# Patient Record
Sex: MALE | Race: WHITE | Employment: FULL TIME | URBAN - NONMETROPOLITAN AREA
[De-identification: names, ages, dates, MRNs, and addresses within clinical notes are randomized per-mention and may not be internally consistent; named-entity substitution may affect disease eponyms.]

---

## 2020-07-15 VITALS
SYSTOLIC BLOOD PRESSURE: 160 MMHG | RESPIRATION RATE: 18 BRPM | HEIGHT: 70 IN | BODY MASS INDEX: 35.65 KG/M2 | DIASTOLIC BLOOD PRESSURE: 98 MMHG | HEART RATE: 78 BPM | WEIGHT: 249 LBS | OXYGEN SATURATION: 97 % | TEMPERATURE: 98.6 F

## 2020-07-15 RX ORDER — CYCLOBENZAPRINE HCL 5 MG
5 TABLET ORAL 3 TIMES DAILY PRN
COMMUNITY
End: 2020-11-27 | Stop reason: CLARIF

## 2020-07-15 RX ORDER — HYDROCODONE BITARTRATE AND ACETAMINOPHEN 5; 325 MG/1; MG/1
1 TABLET ORAL 4 TIMES DAILY
COMMUNITY
End: 2020-11-27 | Stop reason: CLARIF

## 2020-08-06 ENCOUNTER — OFFICE VISIT (OUTPATIENT)
Dept: FAMILY MEDICINE CLINIC | Age: 45
End: 2020-08-06
Payer: COMMERCIAL

## 2020-08-06 VITALS
HEART RATE: 71 BPM | WEIGHT: 247.4 LBS | TEMPERATURE: 98.1 F | HEIGHT: 70 IN | SYSTOLIC BLOOD PRESSURE: 130 MMHG | BODY MASS INDEX: 35.42 KG/M2 | OXYGEN SATURATION: 97 % | DIASTOLIC BLOOD PRESSURE: 76 MMHG

## 2020-08-06 PROBLEM — I10 ESSENTIAL HYPERTENSION: Status: ACTIVE | Noted: 2020-08-06

## 2020-08-06 PROCEDURE — 99213 OFFICE O/P EST LOW 20 MIN: CPT | Performed by: NURSE PRACTITIONER

## 2020-08-06 RX ORDER — LOSARTAN POTASSIUM 100 MG/1
100 TABLET ORAL DAILY
Qty: 60 TABLET | Refills: 5 | Status: SHIPPED
Start: 2020-08-06 | End: 2021-04-30 | Stop reason: SDUPTHER

## 2020-08-06 RX ORDER — LOSARTAN POTASSIUM 100 MG/1
1 TABLET ORAL 2 TIMES DAILY
COMMUNITY
Start: 2020-06-30 | End: 2020-08-06 | Stop reason: SDUPTHER

## 2020-08-06 SDOH — HEALTH STABILITY: MENTAL HEALTH: HOW OFTEN DO YOU HAVE A DRINK CONTAINING ALCOHOL?: MONTHLY OR LESS

## 2020-08-06 SDOH — HEALTH STABILITY: MENTAL HEALTH: HOW MANY STANDARD DRINKS CONTAINING ALCOHOL DO YOU HAVE ON A TYPICAL DAY?: 1 OR 2

## 2020-08-06 ASSESSMENT — ENCOUNTER SYMPTOMS
TROUBLE SWALLOWING: 0
VOICE CHANGE: 0
VOMITING: 0
SHORTNESS OF BREATH: 0
ABDOMINAL PAIN: 0
NAUSEA: 0
BACK PAIN: 1
CONSTIPATION: 0
DIARRHEA: 0
COUGH: 0
WHEEZING: 0
CHEST TIGHTNESS: 0
BLOOD IN STOOL: 0

## 2020-08-06 ASSESSMENT — PATIENT HEALTH QUESTIONNAIRE - PHQ9
SUM OF ALL RESPONSES TO PHQ QUESTIONS 1-9: 0
2. FEELING DOWN, DEPRESSED OR HOPELESS: 0
SUM OF ALL RESPONSES TO PHQ QUESTIONS 1-9: 0
SUM OF ALL RESPONSES TO PHQ9 QUESTIONS 1 & 2: 0
1. LITTLE INTEREST OR PLEASURE IN DOING THINGS: 0

## 2020-08-06 NOTE — PATIENT INSTRUCTIONS
Patient Education        losartan  Pronunciation:  ana paula TAB tan  Brand:  Cozaar  What is the most important information I should know about losartan? Do not use if you are pregnant, and tell your doctor right away if you become pregnant. Losartan can cause injury or death to the unborn baby during your second or third trimester. If you have diabetes, do not use losartan together with any medication that contains aliskiren (a blood pressure medicine). What is losartan? Losartan is an angiotensin II receptor antagonist (sometimes called an ARB blocker). Losartan is used to treat high blood pressure (hypertension) in adults and children who are at least 10years old. It is also used to lower the risk of stroke in certain people with heart disease. Losartan is also used to slow long-term kidney damage in people with type 2 diabetes who also have high blood pressure. Losartan may also be used for purposes not listed in this medication guide. What should I discuss with my healthcare provider before taking losartan? You should not use losartan if you are allergic to it. If you have diabetes, do not use losartan together with any medication that contains aliskiren (a blood pressure medicine). You may also need to avoid taking losartan with aliskiren if you have kidney disease. Do not use if you are pregnant, and tell your doctor right away if you become pregnant. Losartan can cause injury or death to the unborn baby if you take the medicine during your second or third trimester. Tell your doctor if you have ever had:  · kidney disease;  · liver disease;  · congestive heart failure;  · an electrolyte imbalance (such as high levels of potassium in your blood);  · if you are on a low-salt diet; or  · if you are dehydrated. You should not breast-feed while using this medicine. Losartan is not approved for use by anyone younger than 10years old. How should I take losartan?   Follow all directions on your prescription label and read all medication guides or instruction sheets. Your doctor may occasionally change your dose. Use the medicine exactly as directed. You may take losartan with or without food. Call your doctor if you are sick with vomiting or diarrhea, or if you are sweating more than usual. You can easily become dehydrated while taking losartan. This can lead to very low blood pressure, a serious electrolyte imbalance, or kidney failure. Your blood pressure will need to be checked often and you may need other blood and urine tests. It may take 3 to 6 weeks before your blood pressure is under control. For best results, keep using the medicine as directed. Talk with your doctor if your condition does not improve after 3 weeks of treatment. If you have high blood pressure, keep using this medicine even if you feel well. High blood pressure often has no symptoms. You may need to use blood pressure medicine for the rest of your life. Store at room temperature away from moisture, heat, and light. What happens if I miss a dose? Take the medicine as soon as you can, but skip the missed dose if it is almost time for your next dose. Do not take two doses at one time. What happens if I overdose? Seek emergency medical attention or call the Poison Help line at 1-565.859.4772. What should I avoid while taking losartan? Drinking alcohol can further lower your blood pressure and may increase certain side effects of losartan. Do not use potassium supplements or salt substitutes, unless your doctor has told you to. Avoid getting up too fast from a sitting or lying position, or you may feel dizzy. What are the possible side effects of losartan? Get emergency medical help if you have signs of an allergic reaction: hives; difficult breathing; swelling of your face, lips, tongue, or throat.   Call your doctor at once if you have:  · a light-headed feeling, like you might pass out;  · pain or burning when you therapy. [facility] drug information is an informational resource designed to assist licensed healthcare practitioners in caring for their patients and/or to serve consumers viewing this service as a supplement to, and not a substitute for, the expertise, skill, knowledge and judgment of healthcare practitioners. The absence of a warning for a given drug or drug combination in no way should be construed to indicate that the drug or drug combination is safe, effective or appropriate for any given patient. [facility] does not assume any responsibility for any aspect of healthcare administered with the aid of information [facility] provides. The information contained herein is not intended to cover all possible uses, directions, precautions, warnings, drug interactions, allergic reactions, or adverse effects. If you have questions about the drugs you are taking, check with your doctor, nurse or pharmacist.  Copyright 6057-7320 [address]. Version: 16.01. Revision date: 4/10/2019. Care instructions adapted under license by [facility] ([facility]). If you have questions about a medical condition or this instruction, always ask your healthcare professional. [place] [address] any warranty or liability for your use of this information. Patient Education        Home Blood Pressure Test: About This Test  What is it? A home blood pressure test allows you to keep track of your blood pressure at home. Blood pressure is a measure of the force of blood against the walls of your arteries. Blood pressure readings include two numbers, such as 130/80 (say \"130 over 80\"). The first number is the systolic pressure. The second number is the diastolic pressure. Why is this test done? You may do this test at home to:  · Find out if you have high blood pressure. · Track your blood pressure if you have high blood pressure. · Track how well medicine is working to reduce high blood pressure.   · Check how lifestyle changes, such as weight loss and exercise, are affecting blood pressure. How do you prepare for the test?  For at least 30 minutes before you take your blood pressure, don't exercise or use caffeine, tobacco, or medicines that raise blood pressure. Take your blood pressure while you feel comfortable and relaxed. Sit quietly with both feet on the floor for at least 5 minutes before the test.  How is the test done? · Sit with your arm slightly bent and resting on a table so that your upper arm is at the same level as your heart. · Roll up your sleeve or take off your shirt to expose your upper arm. · Wrap the blood pressure cuff around your upper arm so that the lower edge of the cuff is about 1 inch above the bend of your elbow. Proceed with the following steps depending on if you are using an automatic or manual pressure monitor. Automatic blood pressure monitors  · Press the on/off button on the automatic monitor and wait until the ready-to-measure \"heart\" symbol appears next to zero in the display window. · Press the start button. The cuff will inflate and deflate by itself. · Your blood pressure numbers will appear on the screen. · Write your numbers in your log book, along with the date and time. Manual blood pressure monitors  · Place the earpieces of a stethoscope in your ears, and place the bell of the stethoscope over the artery, just below the cuff. · Close the valve on the rubber inflating bulb. · Squeeze the bulb rapidly with your opposite hand to inflate the cuff until the dial or column of mercury reads about 30 mm Hg higher than your usual systolic pressure. If you do not know your usual pressure, inflate the cuff to 210 mm Hg or until the pulse at your wrist disappears. · Open the pressure valve just slightly by twisting or pressing the valve on the bulb.   · As you watch the pressure slowly fall, note the level on the dial at which you first start to hear a pulsing or tapping sound through the stethoscope. This is your systolic blood pressure. · Continue letting the air out slowly. The sounds will become muffled and will finally disappear. Note the pressure when the sounds completely disappear. This is your diastolic blood pressure. Let out all the remaining air. · Write your numbers in your log book, along with the date and time. Follow-up care is a key part of your treatment and safety. Be sure to make and go to all appointments, and call your doctor if you are having problems. It's also a good idea to keep a list of the medicines you take. Where can you learn more? Go to [email or web address] and sign in to your [email or web address] account. Enter C427 in the [employer] box to learn more about \"Home Blood Pressure Test: About This Test.\"     If you do not have an account, please click on the \"Sign Up Now\" link. Current as of: December 16, 2019               Content Version: 12.5  © 3410-9781 Healthwise, Incorporated. Care instructions adapted under license by [facility] ([facility]). If you have questions about a medical condition or this instruction, always ask your healthcare professional. [place] [address] any warranty or liability for your use of this information.

## 2020-08-06 NOTE — PROGRESS NOTES
20  Ryderyohan Levin : 1975 Sex: male  Age: 39 y.o. Chief Complaint   Patient presents with    Other     visit after seeing Dr. Josefa Dickey and having MRI done. Patient was seen by the physician extender and an MRI was ordered he apparently had the MRI on Monday but they have not yet discussed it with him. He has more good days and bad days but continues to have radicular pain in the left side there is no foot drop, incontinence or increased pain      HTN: Stable hypertension, controlled on  losartan (COZAAR) 100 MG tablet, Take 1 tablet by mouth 2 times daily, Disp: , Rfl:    metoprolol tartrate (LOPRESSOR) 25 MG tablet, Take 25 mg by mouth 2 times daily, Disp: , Rfl:   remains at a mild intensity but overall good control, without symptoms, no ringing in the ears, no headaches and no nose bleeds. Better on medications. Patient has been taking Cozaar 1 tablet 2 times a day for a long time, we will decrease his dose to daily based on medication max dose recommendations and if his blood pressure goes up will increase the Lopressor or add another medication. Review of Systems   Constitutional: Negative for activity change, chills, diaphoresis, fatigue, fever and unexpected weight change. HENT: Negative for trouble swallowing and voice change. Eyes: Negative for visual disturbance. Respiratory: Negative for cough, chest tightness, shortness of breath and wheezing. Cardiovascular: Negative for chest pain, palpitations and leg swelling. Gastrointestinal: Negative for abdominal pain, blood in stool, constipation, diarrhea, nausea and vomiting. Endocrine: Negative for polydipsia, polyphagia and polyuria. Genitourinary: Negative for dysuria, enuresis, frequency and hematuria. Musculoskeletal: Positive for back pain. Negative for arthralgias, gait problem, joint swelling, myalgias and neck stiffness. Skin: Negative for rash.    Neurological: Negative for dizziness, seizures, syncope, facial asymmetry, weakness, light-headedness, numbness and headaches. Hematological: Does not bruise/bleed easily. Psychiatric/Behavioral: Negative for behavioral problems, confusion, hallucinations and suicidal ideas. The patient is not nervous/anxious. Current Outpatient Medications:     losartan (COZAAR) 100 MG tablet, Take 1 tablet by mouth daily, Disp: 60 tablet, Rfl: 5    metoprolol tartrate (LOPRESSOR) 25 MG tablet, Take 1 tablet by mouth 2 times daily, Disp: 60 tablet, Rfl: 5    HYDROcodone-acetaminophen (NORCO) 5-325 MG per tablet, Take 1 tablet by mouth 4 times daily. , Disp: , Rfl:     cyclobenzaprine (FLEXERIL) 5 MG tablet, Take 5 mg by mouth 3 times daily as needed for Muscle spasms, Disp: , Rfl:   Allergies   Allergen Reactions    Amoxicillin     Other      YELLOW JACKETS   AND RU-TUSS           History reviewed. No pertinent past medical history. History reviewed. No pertinent surgical history.   Family History   Problem Relation Age of Onset    High Cholesterol Mother     Diabetes Mother     High Blood Pressure Mother     Stroke Father     High Blood Pressure Father     Heart Disease Father     High Blood Pressure Brother      Social History     Socioeconomic History    Marital status:      Spouse name: Not on file    Number of children: Not on file    Years of education: Not on file    Highest education level: Not on file   Occupational History    Not on file   Social Needs    Financial resource strain: Not on file    Food insecurity     Worry: Not on file     Inability: Not on file   [employer] needs     Medical: Not on file     Non-medical: Not on file   Tobacco Use    Smoking status: Never Smoker    Smokeless tobacco: Current User     Types: Snuff   Substance and Sexual Activity    Alcohol use: Yes     Frequency: Monthly or less     Drinks per session: 1 or 2     Binge frequency: Never    Drug use: Never    Sexual activity: Not on file Lifestyle    Physical activity     Days per week: Not on file     Minutes per session: Not on file    Stress: Not on file   Relationships    Social connections     Talks on phone: Not on file     Gets together: Not on file     Attends Rastafari service: Not on file     Active member of club or organization: Not on file     Attends meetings of clubs or organizations: Not on file     Relationship status: Not on file    Intimate partner violence     Fear of current or ex partner: Not on file     Emotionally abused: Not on file     Physically abused: Not on file     Forced sexual activity: Not on file   Other Topics Concern    Not on file   Social History Narrative    Not on file       Vitals:    08/06/20 1550   BP: 130/76   Site: Left Upper Arm   Position: Sitting   Cuff Size: Large Adult   Pulse: 71   Temp: 98.1 °F (36.7 °C)   TempSrc: Oral   SpO2: 97%   Weight: 247 lb 6.4 oz (112.2 kg)   Height: 5' 10\" (1.778 m)       Physical Exam  Constitutional:       Appearance: Normal appearance. He is normal weight. Neck:      Musculoskeletal: Normal range of motion and neck supple. Cardiovascular:      Rate and Rhythm: Normal rate and regular rhythm. Pulmonary:      Effort: Pulmonary effort is normal.      Breath sounds: Normal breath sounds. No wheezing. Chest:      Chest wall: No tenderness. Abdominal:      General: Abdomen is flat. Bowel sounds are normal.      Palpations: Abdomen is soft. Musculoskeletal:         General: Tenderness present. No swelling or deformity. Comments: LUMBAR SPINE:    Inspection/Palpation -  No bony tenderness, left paraspinal tenderness, left SI joint tenderness, no palpable defect, no step offs appreciated, otherwise normal alignment and posture    Range of Motion -   full range of motion                         Strength/Tone -   no atrophy, normal strength and tone.          Diagnostic Tests -   straight leg raise negative sitting/supine bilaterally  GAIT :  Normal gait SENSORY :   sensation intact to light touch and pinprick  REFLEXES :   normal symmetric patellar/achilles tendon reflexes  PULSES :   distal pulses intact         Neurological:      Mental Status: He is alert. Assessment and Plan:  Juanjo Ellington was seen today for other. Diagnoses and all orders for this visit:    Essential hypertension    Lumbar radiculopathy, acute    Other orders  -     losartan (COZAAR) 100 MG tablet; Take 1 tablet by mouth daily  -     metoprolol tartrate (LOPRESSOR) 25 MG tablet; Take 1 tablet by mouth 2 times daily        Return in about 6 months (around 2/6/2021). Patient has been taking Cozaar 1 tablet 2 times a day for a long time, we will decrease his dose to daily based on medication max dose recommendations and if his blood pressure goes up will increase the Lopressor or add another medication.      Seen By:  LORI Tinoco - CNP

## 2020-11-27 ENCOUNTER — VIRTUAL VISIT (OUTPATIENT)
Dept: FAMILY MEDICINE CLINIC | Age: 45
End: 2020-11-27
Payer: COMMERCIAL

## 2020-11-27 LAB — SARS-COV-2: DETECTED

## 2020-11-27 PROCEDURE — 99422 OL DIG E/M SVC 11-20 MIN: CPT | Performed by: NURSE PRACTITIONER

## 2020-11-27 RX ORDER — AZITHROMYCIN 250 MG/1
250 TABLET, FILM COATED ORAL SEE ADMIN INSTRUCTIONS
Qty: 6 TABLET | Refills: 0 | Status: SHIPPED | OUTPATIENT
Start: 2020-11-27 | End: 2020-12-02

## 2020-11-27 RX ORDER — METHYLPREDNISOLONE 4 MG/1
TABLET ORAL
Qty: 21 TABLET | Refills: 0 | Status: SHIPPED | OUTPATIENT
Start: 2020-11-27 | End: 2020-12-03

## 2020-11-27 ASSESSMENT — ENCOUNTER SYMPTOMS
DIARRHEA: 0
EYE REDNESS: 0
BACK PAIN: 0
PHOTOPHOBIA: 0
ABDOMINAL DISTENTION: 0
CHEST TIGHTNESS: 0
EYE PAIN: 0
VOICE CHANGE: 0
EYE DISCHARGE: 0
COLOR CHANGE: 0
VOMITING: 0
BLOOD IN STOOL: 0
ABDOMINAL PAIN: 0
FACIAL SWELLING: 0
SINUS PRESSURE: 1
RECTAL PAIN: 0
EYE ITCHING: 0
APNEA: 0
CHOKING: 0
STRIDOR: 0
ANAL BLEEDING: 0
NAUSEA: 0
COUGH: 0
RHINORRHEA: 1
WHEEZING: 0
CONSTIPATION: 0
SHORTNESS OF BREATH: 0
SINUS PAIN: 0
SORE THROAT: 0
TROUBLE SWALLOWING: 0

## 2020-11-27 ASSESSMENT — PATIENT HEALTH QUESTIONNAIRE - PHQ9
2. FEELING DOWN, DEPRESSED OR HOPELESS: 0
SUM OF ALL RESPONSES TO PHQ QUESTIONS 1-9: 0
SUM OF ALL RESPONSES TO PHQ9 QUESTIONS 1 & 2: 0
1. LITTLE INTEREST OR PLEASURE IN DOING THINGS: 0
SUM OF ALL RESPONSES TO PHQ QUESTIONS 1-9: 0
SUM OF ALL RESPONSES TO PHQ QUESTIONS 1-9: 0

## 2020-11-27 NOTE — PROGRESS NOTES
20  Ivonne Mao : 1975 Sex: male  Age: 39 y.o. Chief Complaint   Patient presents with    Covid Testing     patient wife tested postive     Covid Testing    Sinusitis     a cough for a while now every morning. States I have a sinus infection I think but my wife has tested positive for covid so I have to get checked before I go back to work. States taste buds off a little but smell is OK. No fever, chills, sweats, nausea, vomiting, or diarrhea. This is a virtual visit. Review of Systems   Constitutional: Negative for activity change, appetite change, chills, diaphoresis, fatigue, fever and unexpected weight change. HENT: Positive for postnasal drip, rhinorrhea and sinus pressure. Negative for congestion, dental problem, drooling, ear discharge, ear pain, facial swelling, hearing loss, mouth sores, nosebleeds, sinus pain, sneezing, sore throat, tinnitus, trouble swallowing and voice change. Eyes: Negative for photophobia, pain, discharge, redness, itching and visual disturbance. Respiratory: Negative for apnea, cough, choking, chest tightness, shortness of breath, wheezing and stridor. Cardiovascular: Negative for chest pain, palpitations and leg swelling. Gastrointestinal: Negative for abdominal distention, abdominal pain, anal bleeding, blood in stool, constipation, diarrhea, nausea, rectal pain and vomiting. Endocrine: Negative for cold intolerance, heat intolerance, polydipsia, polyphagia and polyuria. Genitourinary: Negative for decreased urine volume, difficulty urinating, dysuria, enuresis, flank pain, frequency, genital sores, hematuria and urgency. Musculoskeletal: Negative for arthralgias, back pain, gait problem, joint swelling, myalgias, neck pain and neck stiffness. Skin: Negative for color change, pallor, rash and wound. Allergic/Immunologic: Negative for environmental allergies, food allergies and immunocompromised state.    Neurological: Negative for dizziness, tremors, seizures, syncope, facial asymmetry, speech difficulty, weakness, light-headedness, numbness and headaches. Hematological: Negative for adenopathy. Does not bruise/bleed easily. Psychiatric/Behavioral: Negative for agitation, behavioral problems, confusion, decreased concentration, hallucinations, self-injury, sleep disturbance and suicidal ideas. The patient is not nervous/anxious and is not hyperactive. Current Outpatient Medications:     azithromycin (ZITHROMAX) 250 MG tablet, Take 1 tablet by mouth See Admin Instructions for 5 days 500mg on day 1 followed by 250mg on days 2 - 5, Disp: 6 tablet, Rfl: 0    methylPREDNISolone (MEDROL DOSEPACK) 4 MG tablet, Take by mouth., Disp: 21 tablet, Rfl: 0    losartan (COZAAR) 100 MG tablet, Take 1 tablet by mouth daily, Disp: 60 tablet, Rfl: 5    metoprolol tartrate (LOPRESSOR) 25 MG tablet, Take 1 tablet by mouth 2 times daily, Disp: 60 tablet, Rfl: 5  Allergies   Allergen Reactions    Amoxicillin     Other      YELLOW JACKETS   AND RU-TUSS           No past medical history on file. No past surgical history on file.   Family History   Problem Relation Age of Onset    High Cholesterol Mother     Diabetes Mother     High Blood Pressure Mother     Stroke Father     High Blood Pressure Father     Heart Disease Father     High Blood Pressure Brother      Social History     Socioeconomic History    Marital status:      Spouse name: Not on file    Number of children: Not on file    Years of education: Not on file    Highest education level: Not on file   Occupational History    Not on file   Social Needs    Financial resource strain: Not on file    Food insecurity     Worry: Not on file     Inability: Not on file    Transportation needs     Medical: Not on file     Non-medical: Not on file   Tobacco Use    Smoking status: Never Smoker    Smokeless tobacco: Current User     Types: Snuff   Substance and Sexual not more. [] Cut down on Sugar and Fats. [x] Continue Medications as Discussed. [x] Communicated with patient any concerns, to phone office. [x] Follow up as directed. Return if symptoms worsen or fail to improve.       Seen By:  LORI Can - CNP

## 2020-11-29 LAB
SARS-COV-2: DETECTED
SOURCE: ABNORMAL

## 2020-12-08 ENCOUNTER — TELEPHONE (OUTPATIENT)
Dept: FAMILY MEDICINE CLINIC | Age: 45
End: 2020-12-08

## 2020-12-08 NOTE — TELEPHONE ENCOUNTER
I called patient and asked him if he had covid test done and he said yes at [facility] location. He never heard from our office but the health department called and told him he was positive for Covid-19 and he did the 14 day quarantine and went back to work today. I called [facility] lab [phone] spoke with Ariana Gallegos she will fax the results.

## 2021-04-30 RX ORDER — LOSARTAN POTASSIUM 100 MG/1
100 TABLET ORAL DAILY
Qty: 60 TABLET | Refills: 5 | Status: SHIPPED
Start: 2021-04-30 | End: 2022-05-23

## 2021-12-11 ENCOUNTER — HOSPITAL ENCOUNTER (EMERGENCY)
Age: 46
Discharge: HOME | End: 2021-12-11
Payer: COMMERCIAL

## 2021-12-11 VITALS
TEMPERATURE: 96.6 F | HEART RATE: 74 BPM | RESPIRATION RATE: 15 BRPM | WEIGHT: 250 LBS | OXYGEN SATURATION: 99 % | DIASTOLIC BLOOD PRESSURE: 89 MMHG | BODY MASS INDEX: 33.86 KG/M2 | SYSTOLIC BLOOD PRESSURE: 159 MMHG | HEIGHT: 72 IN

## 2021-12-11 DIAGNOSIS — I10 ESSENTIAL HYPERTENSION: ICD-10-CM

## 2021-12-11 DIAGNOSIS — T78.40XA ALLERGIC REACTION, INITIAL ENCOUNTER: Primary | ICD-10-CM

## 2021-12-11 PROCEDURE — 99284 EMERGENCY DEPT VISIT MOD MDM: CPT

## 2021-12-11 PROCEDURE — 6370000000 HC RX 637 (ALT 250 FOR IP): Performed by: NURSE PRACTITIONER

## 2021-12-11 RX ORDER — FAMOTIDINE 20 MG/1
20 TABLET, FILM COATED ORAL ONCE
Status: COMPLETED | OUTPATIENT
Start: 2021-12-11 | End: 2021-12-11

## 2021-12-11 RX ORDER — PREDNISONE 10 MG/1
40 TABLET ORAL DAILY
Qty: 20 TABLET | Refills: 0 | Status: SHIPPED | OUTPATIENT
Start: 2021-12-11 | End: 2021-12-16

## 2021-12-11 RX ORDER — PREDNISONE 20 MG/1
60 TABLET ORAL ONCE
Status: COMPLETED | OUTPATIENT
Start: 2021-12-11 | End: 2021-12-11

## 2021-12-11 RX ADMIN — FAMOTIDINE 20 MG: 20 TABLET, FILM COATED ORAL at 21:51

## 2021-12-11 RX ADMIN — PREDNISONE 60 MG: 20 TABLET ORAL at 21:51

## 2021-12-12 NOTE — ED PROVIDER NOTES
[address] [place] [address]  Department of Emergency Medicine   ED  Encounter Note  Admit Date/RoomTime: 2021  9:48 PM  ED Room: 33/33    NAME: Andreia Jarquin  : 1975  MRN: [id number]     Chief Complaint:  Allergic Reaction (Started a few minutes ago. Itchy, Chest heaveyness, )    History of Present Illness        Andreia Jarquin is a 55 y.o. old male who presents to the emergency department by private vehicle, for gradual onset of rash, pruritus, and chest heaviness after unknown exposure which began 1 hour(s) prior to arrival.  Since onset the symptoms have been persistent and mild in severity. His symptoms are associated with nothing additional and partially relieved by 50 mg of Benadryl he took before arriving. He denies any headache, neck pain, fever, chills, shortness of breath, cough, abdominal pain, nausea, vomiting, diarrhea, constipation, and dysuria. He states that he was exposed to nuts and shellfish, but has not had reactions to those in the past.      ROS   Pertinent positives and negatives are stated within HPI, all other systems reviewed and are negative. Past Medical History:  has no past medical history on file. Surgical History:  has no past surgical history on file. Social History:  reports that he has never smoked. His smokeless tobacco use includes snuff. He reports current alcohol use. He reports that he does not use drugs. Family History: family history includes Diabetes in his mother; Heart Disease in his father; High Blood Pressure in his brother, father, and mother; High Cholesterol in his mother; Stroke in his father. Allergies: Amoxicillin and Other    Physical Exam   Oxygen Saturation Interpretation: Normal.        ED Triage Vitals   BP Temp Temp src Pulse Resp SpO2 Height Weight   -- -- -- -- -- -- -- --         General Appearance/Constitutional:  Alert, development consistent with age. HEENT:  NC/NT. PERRLA. Airway patent.   Neck:  Normal ROM.  Supple. Respiratory:  Clear to auscultation and breath sounds equal.  CV:  Regular rate and rhythm, normal heart sounds, without pathological murmurs, ectopy, gallops, or rubs. GI:  Abdomen Soft, nontender, good bowel sounds. No firm or pulsatile mass. Back:  No costovertebral tenderness. Extremities: No tenderness or edema noted. Integument:  Normal turgor. Warm, dry, with trace erythema to the torso and extremities with no tenderness, visible abruptions, or drainage. Lymphatics: No lymphangitis or adenopathy noted. Neurological:  Oriented. Motor functions intact. Lab / Imaging Results     (All laboratory and radiology results have been personally reviewed by myself)  Labs:  No results found for this visit on 12/11/21. Imaging: All Radiology results interpreted by Radiologist unless otherwise noted. No orders to display       ED Course / Medical Decision Making     Medications   predniSONE (DELTASONE) tablet 60 mg (60 mg Oral Given 12/11/21 2151)   famotidine (PEPCID) tablet 20 mg (20 mg Oral Given 12/11/21 2151)        Re-examination:  12/11/21       Time: 2225  Patients condition is improving after treatment. Consult(s):   None    Procedure(s):   none    MDM:   Presented with chest tightness and pruritic rash after exposure to unknown trigger. He appeared well, nontoxic, and in no distress. He states his symptoms were improving prior to arrival with Benadryl. He was administered prednisone and Pepcid in the emergency department and monitored. He states his symptoms continue to improve. He is appropriate for discharge and outpatient follow-up. He was counseled on monitoring for possible triggers. He is instructed to return to the emergency department immediately with any new or worsening symptoms.     Plan of Care/Counseling:  LORI Blood CNP reviewed today's visit with the patient in addition to providing specific details for the plan of care and counseling regarding the

## 2021-12-23 ENCOUNTER — HOSPITAL ENCOUNTER (EMERGENCY)
Age: 46
Discharge: HOME | End: 2021-12-23
Payer: COMMERCIAL

## 2021-12-23 ENCOUNTER — APPOINTMENT (OUTPATIENT)
Dept: CT IMAGING | Age: 46
End: 2021-12-23
Payer: COMMERCIAL

## 2021-12-23 VITALS
OXYGEN SATURATION: 98 % | SYSTOLIC BLOOD PRESSURE: 158 MMHG | HEART RATE: 70 BPM | DIASTOLIC BLOOD PRESSURE: 89 MMHG | WEIGHT: 250 LBS | BODY MASS INDEX: 33.86 KG/M2 | RESPIRATION RATE: 16 BRPM | HEIGHT: 72 IN | TEMPERATURE: 98 F

## 2021-12-23 DIAGNOSIS — N20.0 KIDNEY STONE: ICD-10-CM

## 2021-12-23 DIAGNOSIS — R10.9 LEFT FLANK PAIN: Primary | ICD-10-CM

## 2021-12-23 LAB
ALBUMIN SERPL-MCNC: 4.9 G/DL (ref 3.5–5.2)
ALP BLD-CCNC: 60 U/L (ref 40–129)
ALT SERPL-CCNC: 56 U/L (ref 0–40)
AMORPHOUS: ABNORMAL
ANION GAP SERPL CALCULATED.3IONS-SCNC: 10 MMOL/L (ref 7–16)
AST SERPL-CCNC: 32 U/L (ref 0–39)
BACTERIA: ABNORMAL /HPF
BASOPHILS ABSOLUTE: 0.07 E9/L (ref 0–0.2)
BASOPHILS RELATIVE PERCENT: 0.4 % (ref 0–2)
BILIRUB SERPL-MCNC: 0.5 MG/DL (ref 0–1.2)
BILIRUBIN URINE: ABNORMAL
BLOOD, URINE: ABNORMAL
BUN BLDV-MCNC: 17 MG/DL (ref 6–20)
CALCIUM SERPL-MCNC: 10 MG/DL (ref 8.6–10.2)
CHLORIDE BLD-SCNC: 102 MMOL/L (ref 98–107)
CLARITY: ABNORMAL
CO2: 26 MMOL/L (ref 22–29)
COLOR: YELLOW
CREAT SERPL-MCNC: 1.4 MG/DL (ref 0.7–1.2)
EOSINOPHILS ABSOLUTE: 0.02 E9/L (ref 0.05–0.5)
EOSINOPHILS RELATIVE PERCENT: 0.1 % (ref 0–6)
GFR AFRICAN AMERICAN: >60
GFR NON-AFRICAN AMERICAN: 54 ML/MIN/1.73
GLUCOSE BLD-MCNC: 131 MG/DL (ref 74–99)
GLUCOSE URINE: NEGATIVE MG/DL
HCT VFR BLD CALC: 50 % (ref 37–54)
HEMOGLOBIN: 16.5 G/DL (ref 12.5–16.5)
IMMATURE GRANULOCYTES #: 0.09 E9/L
IMMATURE GRANULOCYTES %: 0.5 % (ref 0–5)
KETONES, URINE: NEGATIVE MG/DL
LACTIC ACID: 2.2 MMOL/L (ref 0.5–2.2)
LEUKOCYTE ESTERASE, URINE: NEGATIVE
LYMPHOCYTES ABSOLUTE: 0.88 E9/L (ref 1.5–4)
LYMPHOCYTES RELATIVE PERCENT: 5.2 % (ref 20–42)
MCH RBC QN AUTO: 27.6 PG (ref 26–35)
MCHC RBC AUTO-ENTMCNC: 33 % (ref 32–34.5)
MCV RBC AUTO: 83.6 FL (ref 80–99.9)
MONOCYTES ABSOLUTE: 0.49 E9/L (ref 0.1–0.95)
MONOCYTES RELATIVE PERCENT: 2.9 % (ref 2–12)
NEUTROPHILS ABSOLUTE: 15.51 E9/L (ref 1.8–7.3)
NEUTROPHILS RELATIVE PERCENT: 90.9 % (ref 43–80)
NITRITE, URINE: NEGATIVE
PDW BLD-RTO: 12.8 FL (ref 11.5–15)
PH UA: 5.5 (ref 5–9)
PLATELET # BLD: 293 E9/L (ref 130–450)
PMV BLD AUTO: 9.4 FL (ref 7–12)
POTASSIUM REFLEX MAGNESIUM: 5.6 MMOL/L (ref 3.5–5)
PROTEIN UA: 100 MG/DL
RBC # BLD: 5.98 E12/L (ref 3.8–5.8)
RBC UA: ABNORMAL /HPF (ref 0–2)
SODIUM BLD-SCNC: 138 MMOL/L (ref 132–146)
SPECIFIC GRAVITY UA: >=1.03 (ref 1–1.03)
TOTAL PROTEIN: 8.5 G/DL (ref 6.4–8.3)
UROBILINOGEN, URINE: 0.2 E.U./DL
WBC # BLD: 17.1 E9/L (ref 4.5–11.5)
WBC UA: ABNORMAL /HPF (ref 0–5)

## 2021-12-23 PROCEDURE — 6360000004 HC RX CONTRAST MEDICATION: Performed by: RADIOLOGY

## 2021-12-23 PROCEDURE — 81001 URINALYSIS AUTO W/SCOPE: CPT

## 2021-12-23 PROCEDURE — 80053 COMPREHEN METABOLIC PANEL: CPT

## 2021-12-23 PROCEDURE — 2580000003 HC RX 258: Performed by: PHYSICIAN ASSISTANT

## 2021-12-23 PROCEDURE — 96374 THER/PROPH/DIAG INJ IV PUSH: CPT

## 2021-12-23 PROCEDURE — 96375 TX/PRO/DX INJ NEW DRUG ADDON: CPT

## 2021-12-23 PROCEDURE — 6360000002 HC RX W HCPCS: Performed by: PHYSICIAN ASSISTANT

## 2021-12-23 PROCEDURE — 99284 EMERGENCY DEPT VISIT MOD MDM: CPT

## 2021-12-23 PROCEDURE — 85025 COMPLETE CBC W/AUTO DIFF WBC: CPT

## 2021-12-23 PROCEDURE — 74177 CT ABD & PELVIS W/CONTRAST: CPT

## 2021-12-23 PROCEDURE — 83605 ASSAY OF LACTIC ACID: CPT

## 2021-12-23 RX ORDER — KETOROLAC TROMETHAMINE 30 MG/ML
30 INJECTION, SOLUTION INTRAMUSCULAR; INTRAVENOUS ONCE
Status: COMPLETED | OUTPATIENT
Start: 2021-12-23 | End: 2021-12-23

## 2021-12-23 RX ORDER — ONDANSETRON 4 MG/1
4 TABLET, FILM COATED ORAL EVERY 8 HOURS PRN
Qty: 12 TABLET | Refills: 0 | Status: SHIPPED | OUTPATIENT
Start: 2021-12-23 | End: 2021-12-28

## 2021-12-23 RX ORDER — ONDANSETRON 2 MG/ML
4 INJECTION INTRAMUSCULAR; INTRAVENOUS ONCE
Status: COMPLETED | OUTPATIENT
Start: 2021-12-23 | End: 2021-12-23

## 2021-12-23 RX ORDER — CYCLOBENZAPRINE HCL 10 MG
10 TABLET ORAL 3 TIMES DAILY PRN
Qty: 21 TABLET | Refills: 0 | Status: SHIPPED | OUTPATIENT
Start: 2021-12-23 | End: 2022-01-02

## 2021-12-23 RX ORDER — 0.9 % SODIUM CHLORIDE 0.9 %
1000 INTRAVENOUS SOLUTION INTRAVENOUS ONCE
Status: COMPLETED | OUTPATIENT
Start: 2021-12-23 | End: 2021-12-23

## 2021-12-23 RX ORDER — HYDROCODONE BITARTRATE AND ACETAMINOPHEN 5; 325 MG/1; MG/1
1 TABLET ORAL EVERY 6 HOURS PRN
Qty: 12 TABLET | Refills: 0 | Status: SHIPPED | OUTPATIENT
Start: 2021-12-23 | End: 2021-12-26

## 2021-12-23 RX ADMIN — IOPAMIDOL 75 ML: 755 INJECTION, SOLUTION INTRAVENOUS at 11:38

## 2021-12-23 RX ADMIN — ONDANSETRON 4 MG: 2 INJECTION INTRAMUSCULAR; INTRAVENOUS at 10:52

## 2021-12-23 RX ADMIN — SODIUM CHLORIDE 1000 ML: 9 INJECTION, SOLUTION INTRAVENOUS at 12:46

## 2021-12-23 RX ADMIN — KETOROLAC TROMETHAMINE 30 MG: 30 INJECTION, SOLUTION INTRAMUSCULAR; INTRAVENOUS at 10:53

## 2021-12-23 RX ADMIN — SODIUM CHLORIDE 1000 ML: 9 INJECTION, SOLUTION INTRAVENOUS at 10:52

## 2021-12-23 ASSESSMENT — PAIN SCALES - GENERAL
PAINLEVEL_OUTOF10: 10
PAINLEVEL_OUTOF10: 0

## 2021-12-23 ASSESSMENT — PAIN DESCRIPTION - PAIN TYPE: TYPE: ACUTE PAIN

## 2021-12-23 ASSESSMENT — PAIN DESCRIPTION - ORIENTATION: ORIENTATION: LEFT

## 2021-12-23 ASSESSMENT — PAIN DESCRIPTION - LOCATION: LOCATION: FLANK

## 2021-12-23 NOTE — ED PROVIDER NOTES
[facility]                                                                                                                                    Department of Emergency Medicine   ED  Provider Note  Admit Date/RoomTime: 12/23/2021  9:44 AM  ED Room: 10/10        HPI:  12/23/21,   Time: 9:52 AM ROSY Moses is a 55 y.o. male presenting to the ED for left groin and flank pain, beginning early this AM.  The complaint has been persistent, severe in severity, and worsened by nothing. The patient states that his pain woke him from dead sleep at 5 AM.  He does have some mild left flank pain but states that most of the pain is down in his left groin. He has never had anything like this before. The patient reports severe nausea but no vomiting. Bowels have been moving normally. Denies any hematuria, dysuria or polyuria. He feels like if anything his urine has been a little bit decreased. No fever or chills reported. The patient is otherwise in good health. ROS:     Constitutional: Negative for fever and chills  HENT: Negative for ear pain, sore throat and sinus pressure  Eyes: Negative for pain, discharge and redness  Respiratory:  Negative for shortness of breath, cough and wheezing  Cardiovascular: Negative for CP, edema or palpitations  Gastrointestinal: See HPI  Genitourinary:  See HPI  Musculoskeletal: Negative for back pain and arthralgia  Skin: Negative for rash and wound  Neurological: Negative for weakness and headaches  Hematological: Negative for adenopathy    All other systems reviewed and are negative      -------------------------------- PAST HISTORY ----------------------------------  Past Medical History:  has no past medical history on file. Past Surgical History:  has no past surgical history on file. Social History:  reports that he has never smoked. His smokeless tobacco use includes snuff. He reports current alcohol use. He reports that he does not use drugs.     Family History: family history includes Diabetes in his mother; Heart Disease in his father; High Blood Pressure in his brother, father, and mother; High Cholesterol in his mother; Stroke in his father. The patients home medications have been reviewed.     Allergies: Amoxicillin and Other    --------------------------------- RESULTS ------------------------------------------  All laboratory and radiology results have been personally reviewed by myself   LABS:  Results for orders placed or performed during the hospital encounter of 12/23/21   CBC Auto Differential   Result Value Ref Range    WBC 17.1 (H) 4.5 - 11.5 E9/L    RBC 5.98 (H) 3.80 - 5.80 E12/L    Hemoglobin 16.5 12.5 - 16.5 g/dL    Hematocrit 50.0 37.0 - 54.0 %    MCV 83.6 80.0 - 99.9 fL    MCH 27.6 26.0 - 35.0 pg    MCHC 33.0 32.0 - 34.5 %    RDW 12.8 11.5 - 15.0 fL    Platelets 964 631 - 563 E9/L    MPV 9.4 7.0 - 12.0 fL    Neutrophils % 90.9 (H) 43.0 - 80.0 %    Immature Granulocytes % 0.5 0.0 - 5.0 %    Lymphocytes % 5.2 (L) 20.0 - 42.0 %    Monocytes % 2.9 2.0 - 12.0 %    Eosinophils % 0.1 0.0 - 6.0 %    Basophils % 0.4 0.0 - 2.0 %    Neutrophils Absolute 15.51 (H) 1.80 - 7.30 E9/L    Immature Granulocytes # 0.09 E9/L    Lymphocytes Absolute 0.88 (L) 1.50 - 4.00 E9/L    Monocytes Absolute 0.49 0.10 - 0.95 E9/L    Eosinophils Absolute 0.02 (L) 0.05 - 0.50 E9/L    Basophils Absolute 0.07 0.00 - 0.20 E9/L   Comprehensive Metabolic Panel w/ Reflex to MG   Result Value Ref Range    Sodium 138 132 - 146 mmol/L    Potassium reflex Magnesium 5.6 (H) 3.5 - 5.0 mmol/L    Chloride 102 98 - 107 mmol/L    CO2 26 22 - 29 mmol/L    Anion Gap 10 7 - 16 mmol/L    Glucose 131 (H) 74 - 99 mg/dL    BUN 17 6 - 20 mg/dL    CREATININE 1.4 (H) 0.7 - 1.2 mg/dL    GFR Non-African American 54 >=60 mL/min/1.73    GFR African American >60     Calcium 10.0 8.6 - 10.2 mg/dL    Total Protein 8.5 (H) 6.4 - 8.3 g/dL    Albumin 4.9 3.5 - 5.2 g/dL    Total Bilirubin 0.5 0.0 - 1.2 mg/dL secretions, no trismus  Neck: Supple, full ROM, no meningeal signs  Pulmonary: Lungs clear to auscultation bilaterally, no wheezes, rales, or rhonchi. Not in respiratory distress  Cardiovascular:  Regular rate and rhythm, no murmurs, gallops, or rubs. 2+ distal pulses  Abdomen:  No visible rash or erythema. Soft, + BS. No distension. Mildly tender left flank. Moderately tender LLQ. No palpable rigidity, rebound or guarding  Extremities: Moves all extremities x 4. Warm and well perfused  Skin: warm and dry without rash  Neurologic: GCS 15,  Intact. No focal deficits  Psych: Normal Affect      ------------------------ ED COURSE/MEDICAL DECISION MAKING----------------------  Medications   0.9 % sodium chloride bolus (0 mLs IntraVENous Stopped 21 1203)   ketorolac (TORADOL) injection 30 mg (30 mg IntraVENous Given 21 1053)   ondansetron (ZOFRAN) injection 4 mg (4 mg IntraVENous Given 21 1052)   0.9 % sodium chloride bolus (1,000 mLs IntraVENous New Bag 21 1246)   iopamidol (ISOVUE-370) 76 % injection 75 mL (75 mLs IntraVENous Given 21 1138)         Medical Decision Makin:10   Recheck on patient in room. States pain at this time is completely gone. Comfortable. Labs and imaging pending. UA noted. Scan shows a 3 to 4 mm stone in the bladder and other evidence of a recently passed calculus. This was discussed with the patient. He is pain-free at this time. No evidence of true urinary tract infection. I do suggest that he get repeat labs and renal function in the next week to have that followed and ensure his numbers returned to normal.  Otherwise he will be discharged home with some meds for nausea and pain to use as needed. Follow-up with urology with PCP advised. The patient is aware and agreeable to this plan       Counseling:    The emergency provider has spoken with the patient and discussed todays results, in addition to providing specific details for the plan of care and counseling regarding the diagnosis and prognosis. Questions are answered at this time and they are agreeable with the plan.      ------------------------ IMPRESSION AND DISPOSITION -------------------------------    IMPRESSION  1. Left flank pain    2.  Kidney stone        DISPOSITION  Disposition: Discharge to home  Patient condition is stable                   Igor Bernardo PA-C  12/23/21 8708

## 2022-02-22 ENCOUNTER — OFFICE VISIT (OUTPATIENT)
Dept: FAMILY MEDICINE CLINIC | Age: 47
End: 2022-02-22
Payer: COMMERCIAL

## 2022-02-22 VITALS
DIASTOLIC BLOOD PRESSURE: 96 MMHG | OXYGEN SATURATION: 97 % | BODY MASS INDEX: 36.1 KG/M2 | HEIGHT: 71 IN | WEIGHT: 257.9 LBS | HEART RATE: 76 BPM | TEMPERATURE: 97.2 F | SYSTOLIC BLOOD PRESSURE: 154 MMHG

## 2022-02-22 DIAGNOSIS — I10 ESSENTIAL HYPERTENSION: ICD-10-CM

## 2022-02-22 DIAGNOSIS — M54.16 LUMBAR RADICULOPATHY, ACUTE: Primary | ICD-10-CM

## 2022-02-22 PROCEDURE — 99214 OFFICE O/P EST MOD 30 MIN: CPT | Performed by: FAMILY MEDICINE

## 2022-02-22 RX ORDER — METHYLPREDNISOLONE 4 MG/1
TABLET ORAL
Qty: 1 KIT | Refills: 0 | Status: SHIPPED | OUTPATIENT
Start: 2022-02-22 | End: 2022-02-28

## 2022-02-22 RX ORDER — METHOCARBAMOL 750 MG/1
750 TABLET, FILM COATED ORAL EVERY 8 HOURS PRN
Qty: 40 TABLET | Refills: 0 | Status: SHIPPED | OUTPATIENT
Start: 2022-02-22

## 2022-02-22 SDOH — ECONOMIC STABILITY: FOOD INSECURITY: WITHIN THE PAST 12 MONTHS, THE FOOD YOU BOUGHT JUST DIDN'T LAST AND YOU DIDN'T HAVE MONEY TO GET MORE.: NEVER TRUE

## 2022-02-22 SDOH — ECONOMIC STABILITY: FOOD INSECURITY: WITHIN THE PAST 12 MONTHS, YOU WORRIED THAT YOUR FOOD WOULD RUN OUT BEFORE YOU GOT MONEY TO BUY MORE.: NEVER TRUE

## 2022-02-22 ASSESSMENT — ENCOUNTER SYMPTOMS
EYE DISCHARGE: 0
SINUS PAIN: 0
WHEEZING: 0
CONSTIPATION: 0
NAUSEA: 0
DIARRHEA: 0
EYE REDNESS: 0
BACK PAIN: 1
COUGH: 0
CHEST TIGHTNESS: 0
SORE THROAT: 0
ABDOMINAL PAIN: 0
SHORTNESS OF BREATH: 0
TROUBLE SWALLOWING: 0

## 2022-02-22 ASSESSMENT — PATIENT HEALTH QUESTIONNAIRE - PHQ9
SUM OF ALL RESPONSES TO PHQ QUESTIONS 1-9: 0
SUM OF ALL RESPONSES TO PHQ9 QUESTIONS 1 & 2: 0
SUM OF ALL RESPONSES TO PHQ QUESTIONS 1-9: 0
1. LITTLE INTEREST OR PLEASURE IN DOING THINGS: 0
2. FEELING DOWN, DEPRESSED OR HOPELESS: 0

## 2022-02-22 ASSESSMENT — SOCIAL DETERMINANTS OF HEALTH (SDOH): HOW HARD IS IT FOR YOU TO PAY FOR THE VERY BASICS LIKE FOOD, HOUSING, MEDICAL CARE, AND HEATING?: NOT HARD AT ALL

## 2022-02-22 NOTE — PROGRESS NOTES
22  Robbert Range : 1975 Sex: male  Age: 55 y.o. Chief Complaint   Patient presents with    Lower Back Pain     started 2 or 3 weeks ago, he lifts a lot with work, he did PT a year and a half ago for the same thing, this is causing pain in left leg. HPI this pleasant 80-year-old gentleman is here complaining of low back pain radiating down the left leg to the knee. It started about 2 weeks ago and has been worsening. 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 He has had issues with sciatica previously. We did address his blood pressure which is elevated, likely tied into his pain level. He is still taking the losartan 100 mg in the morning and metoprolol 25 mg twice a day. He has not had lab work in some time so I did give him a lab slip and asked him to come back for recheck in 3 months. He also is going to have physical therapy and is back. Review of Systems   Constitutional: Negative for diaphoresis, fatigue, fever and unexpected weight change. HENT: Negative for congestion, ear discharge, sinus pain, sore throat and trouble swallowing. Eyes: Negative for discharge and redness. Respiratory: Negative for cough, chest tightness, shortness of breath and wheezing. Cardiovascular: Negative for chest pain, palpitations and leg swelling. Gastrointestinal: Negative for abdominal pain, constipation, diarrhea and nausea. Endocrine: Negative for polydipsia and polyuria. Genitourinary: Negative for dysuria, flank pain, frequency and urgency. Musculoskeletal: Positive for back pain. Negative for arthralgias and myalgias. Skin: Negative for rash. Allergic/Immunologic: Negative for immunocompromised state. Neurological: Negative for dizziness, syncope, numbness and headaches. Hematological: Does not bruise/bleed easily.    Psychiatric/Behavioral: Negative for sleep disturbance and suicidal ideas. The patient is not nervous/anxious. Physical Exam  Vitals and nursing note reviewed. Constitutional:       Appearance: Normal appearance. HENT:      Head: Normocephalic. Nose: Nose normal.      Mouth/Throat:      Pharynx: Oropharynx is clear. Eyes:      Pupils: Pupils are equal, round, and reactive to light. Cardiovascular:      Rate and Rhythm: Normal rate and regular rhythm. Pulses: Normal pulses. Heart sounds: Normal heart sounds. Pulmonary:      Effort: Pulmonary effort is normal.      Breath sounds: Normal breath sounds. No wheezing, rhonchi or rales. Abdominal:      Palpations: Abdomen is soft. Tenderness: There is no abdominal tenderness. Musculoskeletal:      Lumbar back: Spasms and tenderness present. Decreased range of motion. Skin:     General: Skin is warm and dry. Capillary Refill: Capillary refill takes less than 2 seconds. Neurological:      Mental Status: He is alert and oriented to person, place, and time. Psychiatric:         Mood and Affect: Mood normal.         Behavior: Behavior normal.         Assessment and Plan:  Elan Kellogg was seen today for lower back pain. Diagnoses and all orders for this visit:    Lumbar radiculopathy, acute  -     [facility]    Essential hypertension  -     Comprehensive Metabolic Panel; Future  -     CBC with Auto Differential; Future  -     Lipid Panel; Future  -     TSH; Future    Other orders  -     methylPREDNISolone (MEDROL DOSEPACK) 4 MG tablet; Take by mouth. -     methocarbamol (ROBAXIN-750) 750 MG tablet; Take 1 tablet by mouth every 8 hours as needed (muscle spasm)          Discussions/Education provided to patients during visit:  [] Discussed the importance to stop smoking. [] Advised to monitor eating habits. [] Reviewed and discussed Imaging results. [] Reviewed and discussed Lab results.   [] Discussed the importance of drinking plenty of fluids. [] Cut down on Salt, Caffeine, and Sugar. [x] Continue Medications as Discussed. [x] Communicated with patient any concerns, to phone office. Return in about 3 months (around 5/22/2022).       Seen By:  Millicent Ortiz, DO

## 2022-05-16 ENCOUNTER — NURSE ONLY (OUTPATIENT)
Dept: FAMILY MEDICINE CLINIC | Age: 47
End: 2022-05-16
Payer: COMMERCIAL

## 2022-05-16 DIAGNOSIS — I10 ESSENTIAL HYPERTENSION: Primary | ICD-10-CM

## 2022-05-16 DIAGNOSIS — I10 ESSENTIAL HYPERTENSION: ICD-10-CM

## 2022-05-16 LAB
ALBUMIN SERPL-MCNC: 4.4 G/DL (ref 3.5–5.2)
ALP BLD-CCNC: 51 U/L (ref 40–129)
ALT SERPL-CCNC: 38 U/L (ref 0–40)
ANION GAP SERPL CALCULATED.3IONS-SCNC: 15 MMOL/L (ref 7–16)
AST SERPL-CCNC: 29 U/L (ref 0–39)
BASOPHILS ABSOLUTE: 0.09 E9/L (ref 0–0.2)
BASOPHILS RELATIVE PERCENT: 1.3 % (ref 0–2)
BILIRUB SERPL-MCNC: 0.6 MG/DL (ref 0–1.2)
BUN BLDV-MCNC: 14 MG/DL (ref 6–20)
CALCIUM SERPL-MCNC: 9.3 MG/DL (ref 8.6–10.2)
CHLORIDE BLD-SCNC: 103 MMOL/L (ref 98–107)
CHOLESTEROL, TOTAL: 192 MG/DL (ref 0–199)
CO2: 22 MMOL/L (ref 22–29)
CREAT SERPL-MCNC: 1.3 MG/DL (ref 0.7–1.2)
EOSINOPHILS ABSOLUTE: 0.27 E9/L (ref 0.05–0.5)
EOSINOPHILS RELATIVE PERCENT: 3.8 % (ref 0–6)
GFR AFRICAN AMERICAN: >60
GFR NON-AFRICAN AMERICAN: 59 ML/MIN/1.73
GLUCOSE BLD-MCNC: 95 MG/DL (ref 74–99)
HCT VFR BLD CALC: 51.6 % (ref 37–54)
HDLC SERPL-MCNC: 42 MG/DL
HEMOGLOBIN: 16.2 G/DL (ref 12.5–16.5)
IMMATURE GRANULOCYTES #: 0.03 E9/L
IMMATURE GRANULOCYTES %: 0.4 % (ref 0–5)
LDL CHOLESTEROL CALCULATED: 119 MG/DL (ref 0–99)
LYMPHOCYTES ABSOLUTE: 2.4 E9/L (ref 1.5–4)
LYMPHOCYTES RELATIVE PERCENT: 34 % (ref 20–42)
MCH RBC QN AUTO: 27.6 PG (ref 26–35)
MCHC RBC AUTO-ENTMCNC: 31.4 % (ref 32–34.5)
MCV RBC AUTO: 88.1 FL (ref 80–99.9)
MONOCYTES ABSOLUTE: 0.58 E9/L (ref 0.1–0.95)
MONOCYTES RELATIVE PERCENT: 8.2 % (ref 2–12)
NEUTROPHILS ABSOLUTE: 3.69 E9/L (ref 1.8–7.3)
NEUTROPHILS RELATIVE PERCENT: 52.3 % (ref 43–80)
PDW BLD-RTO: 13.1 FL (ref 11.5–15)
PLATELET # BLD: 267 E9/L (ref 130–450)
PMV BLD AUTO: 10.1 FL (ref 7–12)
POTASSIUM SERPL-SCNC: 5 MMOL/L (ref 3.5–5)
RBC # BLD: 5.86 E12/L (ref 3.8–5.8)
SODIUM BLD-SCNC: 140 MMOL/L (ref 132–146)
TOTAL PROTEIN: 7.8 G/DL (ref 6.4–8.3)
TRIGL SERPL-MCNC: 154 MG/DL (ref 0–149)
TSH SERPL DL<=0.05 MIU/L-ACNC: 1.57 UIU/ML (ref 0.27–4.2)
VLDLC SERPL CALC-MCNC: 31 MG/DL
WBC # BLD: 7.1 E9/L (ref 4.5–11.5)

## 2022-05-16 PROCEDURE — 99999 PR OFFICE/OUTPT VISIT,PROCEDURE ONLY: CPT | Performed by: FAMILY MEDICINE

## 2022-05-16 PROCEDURE — 36415 COLL VENOUS BLD VENIPUNCTURE: CPT | Performed by: FAMILY MEDICINE

## 2022-05-23 RX ORDER — LOSARTAN POTASSIUM 100 MG/1
TABLET ORAL
Qty: 30 TABLET | Refills: 5 | Status: SHIPPED
Start: 2022-05-23 | End: 2022-05-25 | Stop reason: SDUPTHER

## 2022-05-23 NOTE — TELEPHONE ENCOUNTER
Patients last appointment 8/6/2020.   Patients next scheduled appointment   Future Appointments   Date Time Provider Shin Johnson   5/25/2022  3:45 PM LORI Rhodes CNP [facility]

## 2022-05-25 ENCOUNTER — OFFICE VISIT (OUTPATIENT)
Dept: FAMILY MEDICINE CLINIC | Age: 47
End: 2022-05-25
Payer: COMMERCIAL

## 2022-05-25 VITALS
HEART RATE: 66 BPM | HEIGHT: 71 IN | BODY MASS INDEX: 35.36 KG/M2 | WEIGHT: 252.6 LBS | RESPIRATION RATE: 18 BRPM | TEMPERATURE: 97.1 F | OXYGEN SATURATION: 98 % | DIASTOLIC BLOOD PRESSURE: 86 MMHG | SYSTOLIC BLOOD PRESSURE: 134 MMHG

## 2022-05-25 DIAGNOSIS — I10 ESSENTIAL HYPERTENSION: Primary | ICD-10-CM

## 2022-05-25 DIAGNOSIS — M54.16 LUMBAR RADICULOPATHY, ACUTE: ICD-10-CM

## 2022-05-25 DIAGNOSIS — Z13.29 SCREENING FOR THYROID DISORDER: ICD-10-CM

## 2022-05-25 DIAGNOSIS — E78.01 FAMILIAL HYPERCHOLESTEROLEMIA: ICD-10-CM

## 2022-05-25 PROCEDURE — 99214 OFFICE O/P EST MOD 30 MIN: CPT | Performed by: NURSE PRACTITIONER

## 2022-05-25 RX ORDER — LOSARTAN POTASSIUM 100 MG/1
TABLET ORAL
Qty: 30 TABLET | Refills: 5 | Status: SHIPPED | OUTPATIENT
Start: 2022-05-25

## 2022-05-25 ASSESSMENT — ENCOUNTER SYMPTOMS
SHORTNESS OF BREATH: 0
TROUBLE SWALLOWING: 0
BACK PAIN: 1
ABDOMINAL PAIN: 0
DIARRHEA: 0
VOMITING: 0
COUGH: 0
NAUSEA: 0
BLOOD IN STOOL: 0
CHEST TIGHTNESS: 0
CONSTIPATION: 0
VOICE CHANGE: 0
WHEEZING: 0

## 2022-05-25 NOTE — PATIENT INSTRUCTIONS
Patient Education        High Blood Pressure: Care Instructions  Overview     It's normal for blood pressure to go up and down throughout the day. But if it stays up, you have high blood pressure. Another name for high blood pressure ishypertension. Despite what a lot of people think, high blood pressure usually doesn't cause headaches or make you feel dizzy or lightheaded. It usually has no symptoms. But it does increase your risk of stroke, heart attack, and other problems. You and your doctor will talk about your risks of these problems based on yourblood pressure. Your doctor will give you a goal for your blood pressure. Your goal will bebased on your health and your age. Lifestyle changes, such as eating healthy and being active, are always important to help lower blood pressure. You might also take medicine to reachyour blood pressure goal.  Follow-up care is a key part of your treatment and safety. Be sure to make and go to all appointments, and call your doctor if you are having problems. It's also a good idea to know your test results and keep alist of the medicines you take. How can you care for yourself at home? Medical treatment   If you stop taking your medicine, your blood pressure will go back up. You may take one or more types of medicine to lower your blood pressure. Be safe with medicines. Take your medicine exactly as prescribed. Call your doctor if you think you are having a problem with your medicine.  Talk to your doctor before you start taking aspirin every day. Aspirin can help certain people lower their risk of a heart attack or stroke. But taking aspirin isn't right for everyone, because it can cause serious bleeding.  See your doctor regularly. You may need to see the doctor more often at first or until your blood pressure comes down.  If you are taking blood pressure medicine, talk to your doctor before you take decongestants or anti-inflammatory medicine, such as ibuprofen. Some of these medicines can raise blood pressure.  Learn how to check your blood pressure at home. Lifestyle changes   Stay at a healthy weight. This is especially important if you put on weight around the waist. Losing even 10 pounds can help you lower your blood pressure.  If your doctor recommends it, get more exercise. Walking is a good choice. Bit by bit, increase the amount you walk every day. Try for at least 30 minutes on most days of the week. You also may want to swim, bike, or do other activities.  Avoid or limit alcohol. Talk to your doctor about whether you can drink any alcohol.  Try to limit how much sodium you eat to less than 2,300 milligrams (mg) a day. Your doctor may ask you to try to eat less than 1,500 mg a day.  Eat plenty of fruits (such as bananas and oranges), vegetables, legumes, whole grains, and low-fat dairy products.  Lower the amount of saturated fat in your diet. Saturated fat is found in animal products such as milk, cheese, and meat. Limiting these foods may help you lose weight and also lower your risk for heart disease.  Do not smoke. Smoking increases your risk for heart attack and stroke. If you need help quitting, talk to your doctor about stop-smoking programs and medicines. These can increase your chances of quitting for good. When should you call for help? Call 911  anytime you think you may need emergency care. This may mean having symptoms that suggest that your blood pressure is causing a serious heart or blood vessel problem. Your blood pressure may be over 180/120. For example, call 911 if:     You have symptoms of a heart attack. These may include:  ? Chest pain or pressure, or a strange feeling in the chest.  ? Sweating. ? Shortness of breath. ? Nausea or vomiting. ? Pain, pressure, or a strange feeling in the back, neck, jaw, or upper belly or in one or both shoulders or arms. ? Lightheadedness or sudden weakness.   ? A fast or irregular heartbeat.      You have symptoms of a stroke. These may include:  ? Sudden numbness, tingling, weakness, or loss of movement in your face, arm, or leg, especially on only one side of your body. ? Sudden vision changes. ? Sudden trouble speaking. ? Sudden confusion or trouble understanding simple statements. ? Sudden problems with walking or balance. ? A sudden, severe headache that is different from past headaches.      You have severe back or belly pain. Do not wait until your blood pressure comes down on its own. Get help right away. Call your doctor now or seek immediate care if:     Your blood pressure is much higher than normal (such as 180/120 or higher), but you don't have symptoms.      You think high blood pressure is causing symptoms, such as:  ? Severe headache.  ? Blurry vision. Watch closely for changes in your health, and be sure to contact your doctor if:     Your blood pressure measures higher than your doctor recommends at least 2 times. That means the top number is higher or the bottom number is higher, or both.      You think you may be having side effects from your blood pressure medicine. Where can you learn more? Go to https://[employer]. org and sign in to your [employer] account. Enter N553 in the [employer] box to learn more about \"High Blood Pressure: Care Instructions. \"     If you do not have an account, please click on the \"Sign Up Now\" link. Current as of: January 10, 2022               Content Version: 13.2  © 8154-4107 [employer]. Care instructions adapted under license by [facility]. If you have questions about a medical condition or this instruction, always ask your healthcare professional. [address] any warranty or liability for your use of this information.

## 2022-05-25 NOTE — PROGRESS NOTES
Joyce Ospina : 1975 Sex: male  Age: 52 y.o. Chief Complaint   Patient presents with    Hypertension     no issues to discuss today   [address]  presents today for evaluation and management of chronic medical problems. Current medication list reviewed. The patient is tolerating all medications well without adverse events or known side effects. The patient does understand the risk and benefits of the prescribed medications. The patient is not up-to-date on all age-appropriate wellness issues. Patient denies any reccent hospitalizations or ER visit. No Acute Complaints reported:    LAST  VISIT  Patient was seen by the physician extender and an MRI was ordered he apparently had the MRI on Monday but they have not yet discussed it with him. He has more good days and bad days but continues to have radicular pain in the left side there is no foot drop, incontinence or increased pain      HTN: Stable hypertension, controlled on  losartan (COZAAR) 100 MG tablet, Take 1 tablet by mouth 2 times daily, Disp: , Rfl:    metoprolol tartrate (LOPRESSOR) 25 MG tablet, Take 25 mg by mouth 2 times daily, Disp: , Rfl:   remains at a mild intensity but overall good control, without symptoms, no ringing in the ears, no headaches and no nose bleeds. Better on medications. Patient has been taking Cozaar 1 tablet 2 times a day for a long time, we will decrease his dose to daily based on medication max dose recommendations and if his blood pressure goes up will increase the Lopressor or add another medication. Hypertension  Pertinent negatives include no chest pain, headaches, palpitations or shortness of breath. Review of Systems   Constitutional: Negative for activity change, chills, diaphoresis, fatigue, fever and unexpected weight change. HENT: Negative for trouble swallowing and voice change. Eyes: Negative for visual disturbance.    Respiratory: Negative for cough, chest tightness, shortness of breath and wheezing. Cardiovascular: Negative for chest pain, palpitations and leg swelling. Gastrointestinal: Negative for abdominal pain, blood in stool, constipation, diarrhea, nausea and vomiting. Endocrine: Negative for polydipsia, polyphagia and polyuria. Genitourinary: Negative for dysuria, enuresis, frequency and hematuria. Musculoskeletal: Positive for back pain. Negative for arthralgias, gait problem, joint swelling, myalgias and neck stiffness. Skin: Negative for rash. Neurological: Negative for dizziness, seizures, syncope, facial asymmetry, weakness, light-headedness, numbness and headaches. Hematological: Does not bruise/bleed easily. Psychiatric/Behavioral: Negative for behavioral problems, confusion, hallucinations and suicidal ideas. The patient is not nervous/anxious. Current Outpatient Medications:     losartan (COZAAR) 100 MG tablet, TAKE 1 TABLET BY MOUTH EVERY DAY, Disp: 30 tablet, Rfl: 5    metoprolol tartrate (LOPRESSOR) 25 MG tablet, Take 1 tablet by mouth 2 times daily, Disp: 60 tablet, Rfl: 5    methocarbamol (ROBAXIN-750) 750 MG tablet, Take 1 tablet by mouth every 8 hours as needed (muscle spasm), Disp: 40 tablet, Rfl: 0  Allergies   Allergen Reactions    Amoxicillin     Other      YELLOW JACKETS   AND RU-TUSS           History reviewed. No pertinent past medical history. History reviewed. No pertinent surgical history.   Family History   Problem Relation Age of Onset    High Cholesterol Mother     Diabetes Mother     High Blood Pressure Mother     Stroke Father     High Blood Pressure Father     Heart Disease Father     High Blood Pressure Brother      Social History     Socioeconomic History    Marital status:      Spouse name: Not on file    Number of children: Not on file    Years of education: Not on file    Highest education level: Not on file   Occupational History    Not on file   Tobacco Use    Smoking status: Never Smoker    Smokeless tobacco: Current User     Types: Snuff   Vaping Use    Vaping Use: Never used   Substance and Sexual Activity    Alcohol use: Yes    Drug use: Never    Sexual activity: Not on file   Other Topics Concern    Not on file   Social History Narrative    Not on file     Social Determinants of Health     Financial Resource Strain: Low Risk     Difficulty of Paying Living Expenses: Not hard at all   Food Insecurity: No Food Insecurity    Worried About Running Out of Food in the Last Year: Never true    [address] in the Last Year: Never true   Transportation Needs:     Lack of Transportation (Medical): Not on file    Lack of Transportation (Non-Medical): Not on file   Physical Activity:     Days of Exercise per Week: Not on file    Minutes of Exercise per Session: Not on file   Stress:     Feeling of Stress : Not on file   Social Connections:     Frequency of Communication with Friends and Family: Not on file    Frequency of Social Gatherings with Friends and Family: Not on file    Attends Gnosticist Services: Not on file    Active Member of Clubs or Organizations: Not on file    Attends Club or Organization Meetings: Not on file    Marital Status: Not on file   Intimate Partner Violence:     Fear of Current or Ex-Partner: Not on file    Emotionally Abused: Not on file    Physically Abused: Not on file    Sexually Abused: Not on file   Housing Stability:     Unable to Pay for Housing in the Last Year: Not on file    Number of [place] in the Last Year: Not on file    Unstable Housing in the Last Year: Not on file       Vitals:    05/25/22 1550   BP: 134/86   Site: Left Upper Arm   Position: Sitting   Cuff Size: Large Adult   Pulse: 66   Resp: 18   Temp: 97.1 °F (36.2 °C)   TempSrc: Temporal   SpO2: 98%   Weight: 252 lb 9.6 oz (114.6 kg)   Height: 5' 10.5\" (1.791 m)       Physical Exam  Constitutional:       Appearance: Normal appearance.  He is normal weight. Cardiovascular:      Rate and Rhythm: Normal rate and regular rhythm. Pulmonary:      Effort: Pulmonary effort is normal.      Breath sounds: Normal breath sounds. No wheezing. Chest:      Chest wall: No tenderness. Abdominal:      General: Abdomen is flat. Bowel sounds are normal.      Palpations: Abdomen is soft. Musculoskeletal:         General: No swelling, tenderness or deformity. Cervical back: Normal range of motion and neck supple. Comments:       Neurological:      Mental Status: He is alert. Assessment and Plan:  Dora Desai was seen today for hypertension and discuss labs. Diagnoses and all orders for this visit:    Essential hypertension  -     CBC with Auto Differential; Future  -     Comprehensive Metabolic Panel, Fasting; Future  -     Urinalysis; Future    Familial hypercholesterolemia  -     Lipid Panel; Future    Lumbar radiculopathy, acute    BMI 35.0-35.9,adult    Screening for thyroid disorder    Other orders  -     losartan (COZAAR) 100 MG tablet; TAKE 1 TABLET BY MOUTH EVERY DAY  -     metoprolol tartrate (LOPRESSOR) 25 MG tablet; Take 1 tablet by mouth 2 times daily        Return in about 1 year (around 5/25/2023) for Routine Visit with Labs. Patient has been taking Cozaar 1 tablet 2 times a day for a long time, we will decrease his dose to daily based on medication max dose recommendations and if his blood pressure goes up will increase the Lopressor or add another medication.      Seen By:  LORI Quevedo - CNP

## 2022-06-23 NOTE — PROGRESS NOTES
Lab draw Right AC 22 x 1 1/4\".  Venipuncture x 1 Subjective:      Patient ID: Alise Bar is a 68 y.o. female. Chief Complaint   Patient presents with    Coronary Artery Disease    Valvular Heart Disease       Patient Active Problem List    Diagnosis Date Noted    Coronary artery disease involving native coronary artery of native heart without angina pectoris 02/18/2016     Overview Note:     A. History of remote stenting in RCA and LAD. Cath by dr. Jaylen Bell 2015 claims patent with no high grade lesions      COPD (chronic obstructive pulmonary disease) ([facility] [address] 09/18/2012    Hypertension 09/18/2012    H/O hyperlipidemia 09/18/2012    Aortic stenosis 12/03/2011     Overview Note:     [facility]  B. Re-do AVR 6/17/2015 [facility]): for patient/prosthesis mismatch. # 21 MagnaEase bioprosthesis  C. Echo 2017:  MG = 13, normal EF         Current Outpatient Medications   Medication Sig Dispense Refill    zinc sulfate (ZINCATE) 220 (50 Zn) MG capsule Take 1 capsule by mouth daily 30 capsule 3    ascorbic acid (VITAMIN C) 500 MG tablet Take 1 tablet by mouth daily 30 tablet 3    doxepin (SINEQUAN) 100 MG capsule Take 1 capsule by mouth nightly 30 capsule 3    budesonide (PULMICORT) 0.5 MG/2ML nebulizer suspension Take 4 mLs by nebulization 2 times daily 60 ampule 3    clonazePAM (KLONOPIN) 1 MG tablet Take 1 mg by mouth 2 times daily      traMADol (ULTRAM) 50 MG tablet Take 50 mg by mouth every 8 hours as needed for Pain.        aspirin 81 MG tablet Take 81 mg by mouth daily STOP PREOP MED       furosemide (LASIX) 20 MG tablet Take 20 mg by mouth daily   0    benazepril (LOTENSIN) 20 MG tablet Take 20 mg by mouth daily   0    metoprolol (LOPRESSOR) 50 MG tablet Take 1 tablet by mouth 3 times daily (Patient taking differently: Take 50 mg by mouth 2 times daily ) 60 tablet 3    PARoxetine (PAXIL) 10 MG tablet Take 30 mg by mouth daily       omeprazole (PRILOSEC) 40 MG capsule   Take 40 mg by mouth every evening       albuterol (PROVENTIL HFA;VENTOLIN HFA) 108 (90 BASE) MCG/ACT inhaler Inhale 2 puffs into the lungs every 4 hours as needed Instructed to take am of procedure      simvastatin (ZOCOR) 40 MG tablet Take 40 mg by mouth nightly        No current facility-administered medications for this visit. Allergies   Allergen Reactions    Codeine Nausea And Vomiting    Pain Patch [Menthol] Nausea And Vomiting     Unsure of the exact name       Vitals:    06/23/22 1213   BP: (!) 162/66   Pulse: 68   Resp: 14   Weight: 211 lb (95.7 kg)   Height: 5' 1\" (1.549 m)       Social History     Socioeconomic History    Marital status:      Spouse name: Not on file    Number of children: Not on file    Years of education: Not on file    Highest education level: Not on file   Occupational History    Not on file   Tobacco Use    Smoking status: Former Smoker     Packs/day: 1.50     Years: 55.00     Pack years: 82.50     Types: Cigarettes     Start date: 1964     Quit date: 2019     Years since quitting: 3.4    Smokeless tobacco: Never Used   Vaping Use    Vaping Use: Never used   Substance and Sexual Activity    Alcohol use: No     Alcohol/week: 0.0 standard drinks    Drug use: No    Sexual activity: Never   Other Topics Concern    Not on file   Social History Narrative    Not on file     Social Determinants of Health     Financial Resource Strain:     Difficulty of Paying Living Expenses: Not on file   Food Insecurity:     Worried About Running Out of Food in the Last Year: Not on file    [place]asha of Food in the Last Year: Not on file   Transportation Needs:     Lack of Transportation (Medical): Not on file    Lack of Transportation (Non-Medical):  Not on file   Physical Activity:     Days of Exercise per Week: Not on file    Minutes of Exercise per Session: Not on file   Stress:     Feeling of Stress : Not on file   Social Connections:     Frequency of Communication with Friends and Family: Not on file  Frequency of Social Gatherings with Friends and Family: Not on file    Attends Confucianism Services: Not on file    Active Member of Clubs or Organizations: Not on file    Attends Club or Organization Meetings: Not on file    Marital Status: Not on file   Intimate Partner Violence:     Fear of Current or Ex-Partner: Not on file    Emotionally Abused: Not on file    Physically Abused: Not on file    Sexually Abused: Not on file   Housing Stability:     Unable to Pay for Housing in the Last Year: Not on file    Number of [place] in the Last Year: Not on file    Unstable Housing in the Last Year: Not on file       Family History   Problem Relation Age of Onset    Stroke Mother     Heart Disease Mother     Heart Disease Father     Asthma Father     Emphysema Father     Cancer Sister     Cancer Brother     Cancer Sister     Cancer Brother     Cancer Brother     Heart Disease Brother     Stroke Brother     Heart Disease Brother     Emphysema Brother     Emphysema Brother        SUBJECTIVE: Ragini Adame presents to the office today for consult - dr Pili Loo. I have not seen her for four years. I reviewed recent hospital records for her [facility] admission - cardiology was not involved, there was no chf on CTA of chest, and ekg normal.  Delta troponin not sampled. Sent home on continuous oxygen    She complains of fatigue and and is quite sedentary and denies anginal chest pain, orthopnea, paroxysmal nocturnal dyspnea and syncope. Has fibromyalgia (leg pain) and pain in sternum   Daughter here today             Objective:   Physical Exam   Constitutional: She is oriented to person, place, and time. Obese, sickly, on O2 NC She is cooperative. See vitals above. HENT:   Head: Normocephalic and atraumatic. Normal lips, teeth, gums, oral mucosa wet. Eyes: Conjunctivae are normal. Pupils are equal, round,   Neck: No JVD present.    Cardiovascular: Regular rhythm, S1 normal, S2 normal, intact distal pulses and normal pulses. PMI is not displaced. Exam reveals no gallop. Grade I/VI BRYAN RUSB with crisp S2. No AI    Carotid bruit is not present   Pulmonary/Chest: Effort normal and breath sounds normal. No respiratory distress. sternotomy   Abdominal: Soft. Normal appearance and bowel sounds are normal. there is no hepatosplenomegaly. There is no tenderness. Musculoskeletal: Normal range of motion. She exhibits no edema. Gait normal   Neurological: She is alert and oriented to person, place, and time. Gait normal.   Skin: Skin is warm and dry. No bruising, no ecchymosis and no rash noted. Rash is not nodular. She is not diaphoretic. No cyanosis. Psychiatric: She has a normal mood and affect. Her behavior is normal. Thought content normal.     EKG: normal sinus rhythm, normal     ASSESSMENT & PLAN:    Patient Active Problem List   Diagnosis    Aortic stenosis: s/p AVR for stenosis, and re-do for patient/prosthesis mismatch 2015. Normal doppler parameters 2017  No change on auscultation today.  COPD (chronic obstructive pulmonary disease)    Hypertension: at target    Hyperlipidemia: on statin target LDL < 70    Coronary artery disease: hx of remote stenting with patency on preop cath 2015.          OV one year

## 2022-10-10 ENCOUNTER — OFFICE VISIT (OUTPATIENT)
Dept: FAMILY MEDICINE CLINIC | Age: 47
End: 2022-10-10
Payer: COMMERCIAL

## 2022-10-10 VITALS
HEART RATE: 80 BPM | DIASTOLIC BLOOD PRESSURE: 80 MMHG | TEMPERATURE: 97 F | WEIGHT: 251.9 LBS | SYSTOLIC BLOOD PRESSURE: 138 MMHG | OXYGEN SATURATION: 98 % | HEIGHT: 71 IN | BODY MASS INDEX: 35.27 KG/M2

## 2022-10-10 DIAGNOSIS — I10 ESSENTIAL HYPERTENSION: ICD-10-CM
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
## 2022-10-10 DIAGNOSIS — S00.86XA INSECT BITE OF OTHER PART OF HEAD, INITIAL ENCOUNTER: Primary | ICD-10-CM

## 2022-10-10 PROCEDURE — 99213 OFFICE O/P EST LOW 20 MIN: CPT | Performed by: NURSE PRACTITIONER

## 2022-10-10 RX ORDER — DOXYCYCLINE HYCLATE 100 MG
100 TABLET ORAL 2 TIMES DAILY
Qty: 20 TABLET | Refills: 0 | Status: SHIPPED | OUTPATIENT
Start: 2022-10-10 | End: 2022-10-20

## 2022-10-10 ASSESSMENT — ENCOUNTER SYMPTOMS
SINUS PAIN: 0
CONSTIPATION: 0
SHORTNESS OF BREATH: 0
VOICE CHANGE: 0
ANAL BLEEDING: 0
NAUSEA: 0
RECTAL PAIN: 0
FACIAL SWELLING: 0
STRIDOR: 0
EYE REDNESS: 0
SORE THROAT: 0
COLOR CHANGE: 0
APNEA: 0
VOMITING: 0
PHOTOPHOBIA: 0
BLOOD IN STOOL: 0
EYE ITCHING: 0
CHEST TIGHTNESS: 0
DIARRHEA: 0
SINUS PRESSURE: 0
ABDOMINAL DISTENTION: 0
WHEEZING: 0
BACK PAIN: 0
RHINORRHEA: 0
COUGH: 0
EYE DISCHARGE: 0
TROUBLE SWALLOWING: 0
EYE PAIN: 0
ABDOMINAL PAIN: 0
CHOKING: 0

## 2022-10-10 NOTE — PROGRESS NOTES
10/10/22  Herlinda Cruz : 1975 Sex: male  Age: 52 y.o. Chief Complaint   Patient presents with    Insect Bite     Happened about a week ago, middle of front of neck, under chin area, red, itchy, there was a ring around it, about the size of a dime, some clear drainage and tender        Patient complains of insect bite to chin which is very sore. It started out small with ring around it, now getting larger. Review of Systems   Constitutional:  Negative for activity change, appetite change, chills, diaphoresis, fatigue, fever and unexpected weight change. HENT:  Negative for congestion, dental problem, drooling, ear discharge, ear pain, facial swelling, hearing loss, mouth sores, nosebleeds, postnasal drip, rhinorrhea, sinus pressure, sinus pain, sneezing, sore throat, tinnitus, trouble swallowing and voice change. Eyes:  Negative for photophobia, pain, discharge, redness, itching and visual disturbance. Respiratory:  Negative for apnea, cough, choking, chest tightness, shortness of breath, wheezing and stridor. Cardiovascular:  Negative for chest pain, palpitations and leg swelling. Gastrointestinal:  Negative for abdominal distention, abdominal pain, anal bleeding, blood in stool, constipation, diarrhea, nausea, rectal pain and vomiting. Endocrine: Negative for cold intolerance, heat intolerance, polydipsia, polyphagia and polyuria. Genitourinary:  Negative for decreased urine volume, difficulty urinating, dysuria, enuresis, flank pain, frequency, genital sores, hematuria and urgency. Musculoskeletal:  Negative for arthralgias, back pain, gait problem, joint swelling, myalgias, neck pain and neck stiffness. Skin:  Positive for wound (bite on chin). Negative for color change, pallor and rash. Allergic/Immunologic: Negative for environmental allergies, food allergies and immunocompromised state.    Neurological:  Negative for dizziness, tremors, seizures, syncope, facial asymmetry, speech difficulty, weakness, light-headedness, numbness and headaches. Hematological:  Negative for adenopathy. Does not bruise/bleed easily. Psychiatric/Behavioral:  Negative for agitation, behavioral problems, confusion, decreased concentration, hallucinations, self-injury, sleep disturbance and suicidal ideas. The patient is not nervous/anxious and is not hyperactive. Current Outpatient Medications:     doxycycline hyclate (VIBRA-TABS) 100 MG tablet, Take 1 tablet by mouth 2 times daily for 10 days, Disp: 20 tablet, Rfl: 0    mupirocin (BACTROBAN) 2 % ointment, Apply topically 3 times daily. , Disp: 1 each, Rfl: 1    losartan (COZAAR) 100 MG tablet, TAKE 1 TABLET BY MOUTH EVERY DAY, Disp: 30 tablet, Rfl: 5    metoprolol tartrate (LOPRESSOR) 25 MG tablet, Take 1 tablet by mouth 2 times daily, Disp: 60 tablet, Rfl: 5    methocarbamol (ROBAXIN-750) 750 MG tablet, Take 1 tablet by mouth every 8 hours as needed (muscle spasm), Disp: 40 tablet, Rfl: 0  Allergies   Allergen Reactions    Amoxicillin     Other      YELLOW JACKETS   AND RU-TUSS           History reviewed. No pertinent past medical history. History reviewed. No pertinent surgical history.   Family History   Problem Relation Age of Onset    High Cholesterol Mother     Diabetes Mother     High Blood Pressure Mother     Stroke Father     High Blood Pressure Father     Heart Disease Father     High Blood Pressure Brother      Social History     Socioeconomic History    Marital status:      Spouse name: Not on file    Number of children: Not on file    Years of education: Not on file    Highest education level: Not on file   Occupational History    Not on file   Tobacco Use    Smoking status: Never    Smokeless tobacco: Current     Types: Snuff   Vaping Use    Vaping Use: Never used   Substance and Sexual Activity    Alcohol use: Yes    Drug use: Never    Sexual activity: Not on file   Other Topics Concern    Not on file   Social History Narrative    Not on file     Social Determinants of Health     Financial Resource Strain: Low Risk     Difficulty of Paying Living Expenses: Not hard at all   Food Insecurity: No Food Insecurity    Worried About Running Out of Food in the Last Year: Never true    Ran Out of Food in the Last Year: Never true   Transportation Needs: Not on file   Physical Activity: Not on file   Stress: Not on file   Social Connections: Not on file   Intimate Partner Violence: Not on file   Housing Stability: Not on file     Patient Active Problem List   Diagnosis    Essential hypertension        Vitals:    10/10/22 1441   BP: 138/80   Site: Left Upper Arm   Position: Sitting   Cuff Size: Medium Adult   Pulse: 80   Temp: 97 °F (36.1 °C)   TempSrc: Temporal   SpO2: 98%   Weight: 251 lb 14.4 oz (114.3 kg)   Height: 5' 10.5\" (1.791 m)       Physical Exam  Vitals and nursing note reviewed. Constitutional:       General: He is not in acute distress. Appearance: Normal appearance. He is normal weight. He is not ill-appearing, toxic-appearing or diaphoretic. HENT:      Head: Normocephalic and atraumatic. Right Ear: Tympanic membrane, ear canal and external ear normal. There is no impacted cerumen. Left Ear: Tympanic membrane, ear canal and external ear normal. There is no impacted cerumen. Nose: Nose normal. No congestion or rhinorrhea. Mouth/Throat:      Mouth: Mucous membranes are moist.      Pharynx: Oropharynx is clear. No oropharyngeal exudate or posterior oropharyngeal erythema. Eyes:      General: No scleral icterus. Right eye: No discharge. Left eye: No discharge. Extraocular Movements: Extraocular movements intact. Conjunctiva/sclera: Conjunctivae normal.      Pupils: Pupils are equal, round, and reactive to light. Neck:      Vascular: No carotid bruit. Cardiovascular:      Rate and Rhythm: Normal rate and regular rhythm. Pulses: Normal pulses.       Heart sounds: Normal heart sounds. No murmur heard. No friction rub. No gallop. Pulmonary:      Effort: Pulmonary effort is normal. No respiratory distress. Breath sounds: No stridor. No wheezing, rhonchi or rales. Chest:      Chest wall: No tenderness. Abdominal:      General: Abdomen is flat. Bowel sounds are normal. There is no distension. Palpations: Abdomen is soft. There is no mass. Tenderness: There is no abdominal tenderness. There is no right CVA tenderness, left CVA tenderness, guarding or rebound. Hernia: No hernia is present. Musculoskeletal:         General: No swelling, tenderness, deformity or signs of injury. Normal range of motion. Cervical back: Normal range of motion and neck supple. No rigidity. No muscular tenderness. Right lower leg: No edema. Left lower leg: No edema. Lymphadenopathy:      Cervical: No cervical adenopathy. Skin:     General: Skin is warm and dry. Capillary Refill: Capillary refill takes less than 2 seconds. Coloration: Skin is not jaundiced or pale. Findings: Erythema (quarter size to chin swollen and oozing clear drainage.) present. No bruising, lesion or rash. Neurological:      General: No focal deficit present. Mental Status: He is alert and oriented to person, place, and time. Mental status is at baseline. Cranial Nerves: No cranial nerve deficit. Sensory: No sensory deficit. Motor: No weakness. Coordination: Coordination normal.      Gait: Gait normal.      Deep Tendon Reflexes: Reflexes normal.   Psychiatric:         Mood and Affect: Mood normal.         Behavior: Behavior normal.         Thought Content: Thought content normal.         Judgment: Judgment normal.       Assessment and Plan:  Marcelina Rose was seen today for insect bite.     Diagnoses and all orders for this visit:    Insect bite of other part of head, initial encounter    Essential hypertension    Other orders  -     doxycycline hyclate (VIBRA-TABS) 100 MG tablet; Take 1 tablet by mouth 2 times daily for 10 days  -     mupirocin (BACTROBAN) 2 % ointment; Apply topically 3 times daily. Discussions/Education provided to patients during visit:  [] Discussed the importance to stop smoking. [] Advised to monitor eating habits. [] Reviewed and discussed Imaging results. [] Reviewed and discussed Lab results. [] Discussed the importance of drinking plenty of fluids. [] Cut down on Salt and Caffeine.  [] Exercise 2-3 times weekly, if not more. [] Cut down on Sugar and Fats. [x] Continue Medications as Discussed. [x] Communicated with patient any concerns, to phone office. [x] Follow up as directed. Return if symptoms worsen or fail to improve.       Seen By:      LORI Rodriguez - CNP

## 2023-02-27 ENCOUNTER — COMMUNITY OUTREACH (OUTPATIENT)
Dept: FAMILY MEDICINE CLINIC | Age: 48
End: 2023-02-27

## 2023-05-23 RX ORDER — LOSARTAN POTASSIUM 100 MG/1
TABLET ORAL
Qty: 90 TABLET | Refills: 1 | Status: SHIPPED | OUTPATIENT
Start: 2023-05-23

## 2023-05-23 NOTE — TELEPHONE ENCOUNTER
Patients last appointment 5/25/2022. Patients next scheduled appointment   Future Appointments   Date Time Provider Shin Johnson   6/1/2023  8:00 AM SCHEDULE, MITCH [facility]   6/8/2023  3:45 PM LORI Hess CNP [facility]

## 2023-06-01 ENCOUNTER — NURSE ONLY (OUTPATIENT)
Dept: FAMILY MEDICINE CLINIC | Age: 48
End: 2023-06-01
Payer: COMMERCIAL

## 2023-06-01 DIAGNOSIS — I10 ESSENTIAL HYPERTENSION: Primary | ICD-10-CM

## 2023-06-01 DIAGNOSIS — I10 ESSENTIAL HYPERTENSION: ICD-10-CM

## 2023-06-01 DIAGNOSIS — E78.01 FAMILIAL HYPERCHOLESTEROLEMIA: ICD-10-CM

## 2023-06-01 LAB
ALBUMIN SERPL-MCNC: 4.4 G/DL (ref 3.5–5.2)
ALP SERPL-CCNC: 50 U/L (ref 40–129)
ALT SERPL-CCNC: 40 U/L (ref 0–40)
ANION GAP SERPL CALCULATED.3IONS-SCNC: 13 MMOL/L (ref 7–16)
AST SERPL-CCNC: 32 U/L (ref 0–39)
BASOPHILS # BLD: 0.1 E9/L (ref 0–0.2)
BASOPHILS NFR BLD: 1.3 % (ref 0–2)
BILIRUB SERPL-MCNC: 0.5 MG/DL (ref 0–1.2)
BUN SERPL-MCNC: 20 MG/DL (ref 6–20)
CALCIUM SERPL-MCNC: 9.3 MG/DL (ref 8.6–10.2)
CHLORIDE SERPL-SCNC: 103 MMOL/L (ref 98–107)
CHOLESTEROL, TOTAL: 202 MG/DL (ref 0–199)
CO2 SERPL-SCNC: 23 MMOL/L (ref 22–29)
CREAT SERPL-MCNC: 1.3 MG/DL (ref 0.7–1.2)
EOSINOPHIL # BLD: 0.32 E9/L (ref 0.05–0.5)
EOSINOPHIL NFR BLD: 4 % (ref 0–6)
ERYTHROCYTE [DISTWIDTH] IN BLOOD BY AUTOMATED COUNT: 13.8 FL (ref 11.5–15)
GLUCOSE FASTING: 103 MG/DL (ref 74–99)
HCT VFR BLD AUTO: 53.8 % (ref 37–54)
HDLC SERPL-MCNC: 49 MG/DL
HGB BLD-MCNC: 16.7 G/DL (ref 12.5–16.5)
IMM GRANULOCYTES # BLD: 0.04 E9/L
IMM GRANULOCYTES NFR BLD: 0.5 % (ref 0–5)
LDLC SERPL CALC-MCNC: 132 MG/DL (ref 0–99)
LYMPHOCYTES # BLD: 2.44 E9/L (ref 1.5–4)
LYMPHOCYTES NFR BLD: 30.6 % (ref 20–42)
MCH RBC QN AUTO: 28 PG (ref 26–35)
MCHC RBC AUTO-ENTMCNC: 31 % (ref 32–34.5)
MCV RBC AUTO: 90.3 FL (ref 80–99.9)
MONOCYTES # BLD: 0.72 E9/L (ref 0.1–0.95)
MONOCYTES NFR BLD: 9 % (ref 2–12)
NEUTROPHILS # BLD: 4.36 E9/L (ref 1.8–7.3)
NEUTS SEG NFR BLD: 54.6 % (ref 43–80)
PLATELET # BLD AUTO: 271 E9/L (ref 130–450)
PMV BLD AUTO: 10 FL (ref 7–12)
POTASSIUM SERPL-SCNC: 5.1 MMOL/L (ref 3.5–5)
PROT SERPL-MCNC: 7.8 G/DL (ref 6.4–8.3)
RBC # BLD AUTO: 5.96 E12/L (ref 3.8–5.8)
SODIUM SERPL-SCNC: 139 MMOL/L (ref 132–146)
TRIGL SERPL-MCNC: 107 MG/DL (ref 0–149)
VLDLC SERPL CALC-MCNC: 21 MG/DL
WBC # BLD: 8 E9/L (ref 4.5–11.5)

## 2023-06-01 PROCEDURE — 99999 PR OFFICE/OUTPT VISIT,PROCEDURE ONLY: CPT | Performed by: NURSE PRACTITIONER

## 2023-06-01 PROCEDURE — 36415 COLL VENOUS BLD VENIPUNCTURE: CPT | Performed by: NURSE PRACTITIONER

## 2023-06-08 ENCOUNTER — OFFICE VISIT (OUTPATIENT)
Dept: FAMILY MEDICINE CLINIC | Age: 48
End: 2023-06-08

## 2023-06-08 VITALS
SYSTOLIC BLOOD PRESSURE: 138 MMHG | DIASTOLIC BLOOD PRESSURE: 78 MMHG | HEIGHT: 71 IN | TEMPERATURE: 97.2 F | WEIGHT: 253.5 LBS | HEART RATE: 72 BPM | BODY MASS INDEX: 35.49 KG/M2 | OXYGEN SATURATION: 95 %

## 2023-06-08 DIAGNOSIS — Z12.12 SCREENING FOR COLORECTAL CANCER: Primary | ICD-10-CM

## 2023-06-08 DIAGNOSIS — E66.01 SEVERE OBESITY (BMI 35.0-39.9) WITH COMORBIDITY (HCC): ICD-10-CM

## 2023-06-08 DIAGNOSIS — Z13.29 SCREENING FOR THYROID DISORDER: ICD-10-CM

## 2023-06-08 DIAGNOSIS — M54.16 LUMBAR RADICULOPATHY, ACUTE: ICD-10-CM

## 2023-06-08 DIAGNOSIS — I10 ESSENTIAL HYPERTENSION: ICD-10-CM

## 2023-06-08 DIAGNOSIS — Z12.11 SCREENING FOR COLORECTAL CANCER: Primary | ICD-10-CM

## 2023-06-08 DIAGNOSIS — E78.01 FAMILIAL HYPERCHOLESTEROLEMIA: ICD-10-CM

## 2023-06-08 DIAGNOSIS — Z12.5 ENCOUNTER FOR PROSTATE CANCER SCREENING: ICD-10-CM

## 2023-06-08 RX ORDER — LOSARTAN POTASSIUM 100 MG/1
100 TABLET ORAL DAILY
Qty: 90 TABLET | Refills: 3 | Status: SHIPPED | OUTPATIENT
Start: 2023-06-08

## 2023-06-08 SDOH — ECONOMIC STABILITY: FOOD INSECURITY: WITHIN THE PAST 12 MONTHS, THE FOOD YOU BOUGHT JUST DIDN'T LAST AND YOU DIDN'T HAVE MONEY TO GET MORE.: NEVER TRUE

## 2023-06-08 SDOH — ECONOMIC STABILITY: INCOME INSECURITY: HOW HARD IS IT FOR YOU TO PAY FOR THE VERY BASICS LIKE FOOD, HOUSING, MEDICAL CARE, AND HEATING?: NOT HARD AT ALL

## 2023-06-08 SDOH — ECONOMIC STABILITY: FOOD INSECURITY: WITHIN THE PAST 12 MONTHS, YOU WORRIED THAT YOUR FOOD WOULD RUN OUT BEFORE YOU GOT MONEY TO BUY MORE.: NEVER TRUE

## 2023-06-08 SDOH — ECONOMIC STABILITY: HOUSING INSECURITY
IN THE LAST 12 MONTHS, WAS THERE A TIME WHEN YOU DID NOT HAVE A STEADY PLACE TO SLEEP OR SLEPT IN A SHELTER (INCLUDING NOW)?: NO

## 2023-06-08 ASSESSMENT — ENCOUNTER SYMPTOMS
ABDOMINAL PAIN: 0
CHEST TIGHTNESS: 0
COUGH: 0
WHEEZING: 0
VOMITING: 0
BACK PAIN: 0
NAUSEA: 0
VOICE CHANGE: 0
DIARRHEA: 0
CONSTIPATION: 0
BLOOD IN STOOL: 0
SHORTNESS OF BREATH: 0
TROUBLE SWALLOWING: 0

## 2023-06-08 ASSESSMENT — PATIENT HEALTH QUESTIONNAIRE - PHQ9
SUM OF ALL RESPONSES TO PHQ QUESTIONS 1-9: 0
SUM OF ALL RESPONSES TO PHQ QUESTIONS 1-9: 0
SUM OF ALL RESPONSES TO PHQ9 QUESTIONS 1 & 2: 0
SUM OF ALL RESPONSES TO PHQ QUESTIONS 1-9: 0
1. LITTLE INTEREST OR PLEASURE IN DOING THINGS: 0
SUM OF ALL RESPONSES TO PHQ QUESTIONS 1-9: 0
2. FEELING DOWN, DEPRESSED OR HOPELESS: 0

## 2023-06-08 NOTE — PROGRESS NOTES
normal.         Mood and Affect: Mood normal.         Behavior: Behavior normal.         Thought Content: Thought content does not include homicidal or suicidal ideation. Thought content does not include homicidal or suicidal plan. Seen By:  LORI Velázquez CNP  *NOTE: This report was transcribed using voice recognition software. Every effort was made to ensure accuracy; however, inadvertent computerized transcription errors may be present.

## 2023-06-09 ENCOUNTER — TELEPHONE (OUTPATIENT)
Dept: FAMILY MEDICINE CLINIC | Age: 48
End: 2023-06-09

## 2023-06-09 ENCOUNTER — NURSE ONLY (OUTPATIENT)
Dept: FAMILY MEDICINE CLINIC | Age: 48
End: 2023-06-09
Payer: COMMERCIAL

## 2023-06-09 DIAGNOSIS — Z12.5 ENCOUNTER FOR PROSTATE CANCER SCREENING: ICD-10-CM

## 2023-06-09 DIAGNOSIS — Z12.5 SPECIAL SCREENING FOR MALIGNANT NEOPLASM OF PROSTATE: Primary | ICD-10-CM

## 2023-06-09 LAB — PSA SERPL-MCNC: 1.18 NG/ML (ref 0–4)

## 2023-06-09 PROCEDURE — 36415 COLL VENOUS BLD VENIPUNCTURE: CPT | Performed by: NURSE PRACTITIONER

## 2023-06-09 PROCEDURE — 99999 PR OFFICE/OUTPT VISIT,PROCEDURE ONLY: CPT | Performed by: NURSE PRACTITIONER

## 2023-06-09 NOTE — TELEPHONE ENCOUNTER
Called pt to see if he could come back in to have a PSA drawn, called the lab yesterday while he was here to see if it could be added and they said yes and then they called later after pt was gone and states that it was a day too late to add so we need him to schedule to come back.  Left a message to return call

## 2023-06-27 LAB — NONINV COLON CA DNA+OCC BLD SCRN STL QL: NEGATIVE

## 2023-06-28 ENCOUNTER — TELEPHONE (OUTPATIENT)
Dept: FAMILY MEDICINE CLINIC | Age: 48
End: 2023-06-28

## 2024-04-23 ENCOUNTER — OFFICE VISIT (OUTPATIENT)
Dept: FAMILY MEDICINE CLINIC | Age: 49
End: 2024-04-23
Payer: COMMERCIAL

## 2024-04-23 VITALS
SYSTOLIC BLOOD PRESSURE: 158 MMHG | OXYGEN SATURATION: 96 % | RESPIRATION RATE: 17 BRPM | HEIGHT: 71 IN | HEART RATE: 70 BPM | BODY MASS INDEX: 35.42 KG/M2 | WEIGHT: 253 LBS | TEMPERATURE: 98.7 F | DIASTOLIC BLOOD PRESSURE: 100 MMHG

## 2024-04-23 DIAGNOSIS — M25.512 CHRONIC LEFT SHOULDER PAIN: ICD-10-CM

## 2024-04-23 DIAGNOSIS — R94.31 ABNORMAL FINDING ON EKG: ICD-10-CM

## 2024-04-23 DIAGNOSIS — S20.361A TICK BITE OF RIGHT FRONT WALL OF THORAX, INITIAL ENCOUNTER: ICD-10-CM

## 2024-04-23 DIAGNOSIS — Z82.49 FAMILY HISTORY OF HEART VALVE ABNORMALITY: ICD-10-CM

## 2024-04-23 DIAGNOSIS — E55.9 VITAMIN D DEFICIENCY: ICD-10-CM

## 2024-04-23 DIAGNOSIS — E53.8 VITAMIN B 12 DEFICIENCY: ICD-10-CM

## 2024-04-23 DIAGNOSIS — Z12.5 ENCOUNTER FOR PROSTATE CANCER SCREENING: ICD-10-CM

## 2024-04-23 DIAGNOSIS — R53.83 OTHER FATIGUE: ICD-10-CM

## 2024-04-23 DIAGNOSIS — I10 ESSENTIAL HYPERTENSION: Primary | ICD-10-CM
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
## 2024-04-23 DIAGNOSIS — G89.29 CHRONIC LEFT SHOULDER PAIN: ICD-10-CM

## 2024-04-23 DIAGNOSIS — Z13.29 SCREENING FOR THYROID DISORDER: ICD-10-CM

## 2024-04-23 DIAGNOSIS — M79.10 MUSCLE PAIN: ICD-10-CM

## 2024-04-23 DIAGNOSIS — E78.01 FAMILIAL HYPERCHOLESTEROLEMIA: ICD-10-CM

## 2024-04-23 DIAGNOSIS — R07.89 OTHER CHEST PAIN: ICD-10-CM

## 2024-04-23 DIAGNOSIS — M25.50 ARTHRALGIA, UNSPECIFIED JOINT: ICD-10-CM

## 2024-04-23 DIAGNOSIS — E66.01 SEVERE OBESITY (BMI 35.0-39.9) WITH COMORBIDITY (HCC): ICD-10-CM

## 2024-04-23 LAB
ALBUMIN: 4.5 G/DL (ref 3.5–5.2)
ALP BLD-CCNC: 48 U/L (ref 40–129)
ALT SERPL-CCNC: 57 U/L (ref 0–40)
ANION GAP SERPL CALCULATED.3IONS-SCNC: 21 MMOL/L (ref 7–16)
AST SERPL-CCNC: 38 U/L (ref 0–39)
BASOPHILS ABSOLUTE: 0.1 K/UL (ref 0–0.2)
BASOPHILS RELATIVE PERCENT: 1 % (ref 0–2)
BILIRUB SERPL-MCNC: 0.7 MG/DL (ref 0–1.2)
BUN BLDV-MCNC: 14 MG/DL (ref 6–20)
CALCIUM SERPL-MCNC: 9.5 MG/DL (ref 8.6–10.2)
CHLORIDE BLD-SCNC: 101 MMOL/L (ref 98–107)
CHOLESTEROL: 185 MG/DL
CO2: 17 MMOL/L (ref 22–29)
CREAT SERPL-MCNC: 1.1 MG/DL (ref 0.7–1.2)
EOSINOPHILS ABSOLUTE: 0.21 K/UL (ref 0.05–0.5)
EOSINOPHILS RELATIVE PERCENT: 3 % (ref 0–6)
GFR SERPL CREATININE-BSD FRML MDRD: 81 ML/MIN/1.73M2
GLUCOSE FASTING: 99 MG/DL (ref 74–99)
HCT VFR BLD CALC: 51.2 % (ref 37–54)
HDLC SERPL-MCNC: 40 MG/DL
HEMOGLOBIN: 16.6 G/DL (ref 12.5–16.5)
IMMATURE GRANULOCYTES %: 1 % (ref 0–5)
IMMATURE GRANULOCYTES ABSOLUTE: 0.05 K/UL (ref 0–0.58)
LDL CHOLESTEROL: 115 MG/DL
LYMPHOCYTES ABSOLUTE: 2.04 K/UL (ref 1.5–4)
LYMPHOCYTES RELATIVE PERCENT: 29 % (ref 20–42)
MCH RBC QN AUTO: 27.9 PG (ref 26–35)
MCHC RBC AUTO-ENTMCNC: 32.4 G/DL (ref 32–34.5)
MCV RBC AUTO: 85.9 FL (ref 80–99.9)
MONOCYTES ABSOLUTE: 0.64 K/UL (ref 0.1–0.95)
MONOCYTES RELATIVE PERCENT: 9 % (ref 2–12)
NEUTROPHILS ABSOLUTE: 3.96 K/UL (ref 1.8–7.3)
NEUTROPHILS RELATIVE PERCENT: 57 % (ref 43–80)
PDW BLD-RTO: 13.1 % (ref 11.5–15)
PLATELET # BLD: 281 K/UL (ref 130–450)
PMV BLD AUTO: 10.3 FL (ref 7–12)
POTASSIUM SERPL-SCNC: 5 MMOL/L (ref 3.5–5)
RBC # BLD: 5.96 M/UL (ref 3.8–5.8)
SEDIMENTATION RATE, ERYTHROCYTE: 2 MM/HR (ref 0–15)
SODIUM BLD-SCNC: 139 MMOL/L (ref 132–146)
TOTAL PROTEIN: 8 G/DL (ref 6.4–8.3)
TRIGL SERPL-MCNC: 152 MG/DL
TSH SERPL DL<=0.05 MIU/L-ACNC: 1.38 UIU/ML (ref 0.27–4.2)
VITAMIN B-12: 1055 PG/ML (ref 211–946)
VITAMIN D 25-HYDROXY: 40.8 NG/ML (ref 30–100)
VLDLC SERPL CALC-MCNC: 30 MG/DL
WBC # BLD: 7 K/UL (ref 4.5–11.5)

## 2024-04-23 PROCEDURE — 3077F SYST BP >= 140 MM HG: CPT | Performed by: NURSE PRACTITIONER

## 2024-04-23 PROCEDURE — 3080F DIAST BP >= 90 MM HG: CPT | Performed by: NURSE PRACTITIONER

## 2024-04-23 PROCEDURE — 99214 OFFICE O/P EST MOD 30 MIN: CPT | Performed by: NURSE PRACTITIONER

## 2024-04-23 PROCEDURE — 93000 ELECTROCARDIOGRAM COMPLETE: CPT | Performed by: NURSE PRACTITIONER

## 2024-04-23 ASSESSMENT — ENCOUNTER SYMPTOMS
CHEST TIGHTNESS: 0
DIARRHEA: 0
CONSTIPATION: 0
VOICE CHANGE: 0
WHEEZING: 0
ABDOMINAL PAIN: 0
VOMITING: 0
BLOOD IN STOOL: 0
BACK PAIN: 0
SHORTNESS OF BREATH: 0
COUGH: 0
NAUSEA: 0
TROUBLE SWALLOWING: 0

## 2024-04-23 ASSESSMENT — PATIENT HEALTH QUESTIONNAIRE - PHQ9
SUM OF ALL RESPONSES TO PHQ QUESTIONS 1-9: 0
SUM OF ALL RESPONSES TO PHQ QUESTIONS 1-9: 0
2. FEELING DOWN, DEPRESSED OR HOPELESS: NOT AT ALL
2. FEELING DOWN, DEPRESSED OR HOPELESS: NOT AT ALL
SUM OF ALL RESPONSES TO PHQ9 QUESTIONS 1 & 2: 0
SUM OF ALL RESPONSES TO PHQ QUESTIONS 1-9: 0
1. LITTLE INTEREST OR PLEASURE IN DOING THINGS: NOT AT ALL
SUM OF ALL RESPONSES TO PHQ QUESTIONS 1-9: 0
SUM OF ALL RESPONSES TO PHQ9 QUESTIONS 1 & 2: 0
1. LITTLE INTEREST OR PLEASURE IN DOING THINGS: NOT AT ALL

## 2024-04-23 NOTE — PROGRESS NOTES
right Ani cubitale venipuncture performed   
erythema, no epicondyle, no radial head tenderness, no olecranon tenderness, no bursa, no supracondylar tenderness, no extensor carpi radialis brevis tenderness, no flexor pronator mass tenderness, no ulnar groove tenderness, no triceps tendon tenderness, no triceps tenderness no biceps tenderness    Range of Motion - normal/full extension and flexion,        Strength/Tone -   normal strength and tone                   Neurological:      Mental Status: He is alert.                Seen By:  LORI Coyle - CNP  *NOTE: This report was transcribed using voice recognition software. Every effort was made to ensure accuracy; however, inadvertent computerized transcription errors may be present.

## 2024-04-26 ENCOUNTER — TELEPHONE (OUTPATIENT)
Dept: ADMINISTRATIVE | Age: 49
End: 2024-04-26

## 2024-04-26 NOTE — TELEPHONE ENCOUNTER
.Patient Appointment Form:      PCP: Virgilio Bates APRN - CNP    Referring: Virgilio Bates APRN - CNP    Has the Patient:    Seen a Cardiologist? no    Had a heart catheterization? no    Had heart surgery? no    Had a stress test or nuclear stress test? no    Had an echocardiogram? no    Had a vascular ultrasound? no    Had a 24/48 heart monitor or extended cardiac event monitor? no    Had recent blood work in the last 6 months? yes    date: 04/23/2024    ordering physician: Paulo    Had a pacemaker/ICD/ILR implant? no    Seen an Electrophysiologist? no        Will send records via: in Epic      Date & time of appointment:  65/13/2024 0800 Dr. Heller

## 2024-04-27 LAB — BORRELIA BURGDORFERI ABS TOTAL: 0.13 IV

## 2024-05-13 ENCOUNTER — OFFICE VISIT (OUTPATIENT)
Dept: CARDIOLOGY CLINIC | Age: 49
End: 2024-05-13
Payer: COMMERCIAL

## 2024-05-13 VITALS
DIASTOLIC BLOOD PRESSURE: 98 MMHG | RESPIRATION RATE: 16 BRPM | WEIGHT: 259.8 LBS | HEIGHT: 72 IN | HEART RATE: 62 BPM | BODY MASS INDEX: 35.19 KG/M2 | SYSTOLIC BLOOD PRESSURE: 173 MMHG

## 2024-05-13 DIAGNOSIS — R94.31 ABNORMAL EKG: Primary | ICD-10-CM

## 2024-05-13 DIAGNOSIS — R94.31 ABNORMAL ELECTROCARDIOGRAPHY: ICD-10-CM

## 2024-05-13 DIAGNOSIS — I10 ESSENTIAL HYPERTENSION: ICD-10-CM

## 2024-05-13 PROCEDURE — 93000 ELECTROCARDIOGRAM COMPLETE: CPT | Performed by: INTERNAL MEDICINE

## 2024-05-13 PROCEDURE — 3077F SYST BP >= 140 MM HG: CPT | Performed by: INTERNAL MEDICINE

## 2024-05-13 PROCEDURE — 99204 OFFICE O/P NEW MOD 45 MIN: CPT | Performed by: INTERNAL MEDICINE

## 2024-05-13 PROCEDURE — 3080F DIAST BP >= 90 MM HG: CPT | Performed by: INTERNAL MEDICINE

## 2024-05-13 NOTE — PROGRESS NOTES
Chief Complaint   Patient presents with    abnormal ekg       Patient Active Problem List    Diagnosis Date Noted    Severe obesity (BMI 35.0-39.9) with comorbidity (HCC) 06/08/2023    Essential hypertension 08/06/2020     Overview Note:     Decreasing the Cozaar to 1 daily and will increase Lopressor if home blood pressure monitoring trends upward.         Current Outpatient Medications   Medication Sig Dispense Refill    metoprolol tartrate (LOPRESSOR) 25 MG tablet Take 1 tablet by mouth 2 times daily 180 tablet 3    losartan (COZAAR) 100 MG tablet Take 1 tablet by mouth daily 90 tablet 3    methocarbamol (ROBAXIN-750) 750 MG tablet Take 1 tablet by mouth every 8 hours as needed (muscle spasm) 40 tablet 0     No current facility-administered medications for this visit.        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  Has discomfort in shoulders, katya left, with repetitive movements or certain positions  Also has mild tightness in left upper chest - well localized  No exertional symtpoms  Non smoker, no family hx of premature CAD, , HDL 40  HTN for at least 15 yrs.           Physical Exam   BP (!) 173/98   Pulse 62   Resp 16   Ht 1.829 m (6')   Wt 117.8 kg (259 lb 12.8 oz)   BMI 35.24 kg/m²   Constitutional: Oriented to person, place, and time. Obese No distress.    Neck: No JVD present. Carotid bruit is not present.   Cardiovascular: Normal rate, regular rhythm, normal heart sounds and intact distal pulses.  No gallop and no friction rub.  No murmur heard.  Pulmonary/Chest: Effort normal and breath sounds normal.   Abdominal: Soft. Bowel sounds are normal. No

## 2024-05-14 ENCOUNTER — TELEPHONE (OUTPATIENT)
Dept: CARDIOLOGY CLINIC | Age: 49
End: 2024-05-14

## 2024-05-14 ENCOUNTER — TELEPHONE (OUTPATIENT)
Dept: CARDIOLOGY | Age: 49
End: 2024-05-14

## 2024-05-14 NOTE — TELEPHONE ENCOUNTER
Nuclear stress test denied by insurance.  Since he was supposed to be a  walker, did you want to switch patient to a regular stress test?  Please put in new order if so. Thanks!

## 2024-05-14 NOTE — TELEPHONE ENCOUNTER
When putting in stress order, you put do not stop beta blockers.  Id this correct on this patient or are they following your protocol.  Please advise so I can advise stress nurses

## 2024-05-15 ENCOUNTER — TELEPHONE (OUTPATIENT)
Dept: CARDIOLOGY | Age: 49
End: 2024-05-15

## 2024-05-15 DIAGNOSIS — R94.31 ABNORMAL EKG: Primary | ICD-10-CM

## 2024-05-15 NOTE — TELEPHONE ENCOUNTER
Left message for patient to inform patient that insurance denied nuclear stress test and that he was switched to a regular stress test.  Asked patient to return call so we could reschedule him.

## 2024-05-17 ENCOUNTER — TELEPHONE (OUTPATIENT)
Dept: CARDIOLOGY | Age: 49
End: 2024-05-17

## 2024-05-17 NOTE — TELEPHONE ENCOUNTER
Left message on voice mail to remind patient of regular stress test appointment on 5/21/24 at 1030. Instructions for test, to hold lopressor 48 hours before test left on voice mail. Asked patient to call with any questions or if unable to keep appointment.

## 2024-05-20 ENCOUNTER — APPOINTMENT (OUTPATIENT)
Dept: GENERAL RADIOLOGY | Age: 49
End: 2024-05-20
Payer: COMMERCIAL

## 2024-05-20 ENCOUNTER — HOSPITAL ENCOUNTER (OUTPATIENT)
Age: 49
Setting detail: OBSERVATION
Discharge: HOME | End: 2024-05-21
Attending: EMERGENCY MEDICINE | Admitting: INTERNAL MEDICINE
Payer: COMMERCIAL

## 2024-05-20 ENCOUNTER — TELEPHONE (OUTPATIENT)
Dept: CARDIOLOGY | Age: 49
End: 2024-05-20

## 2024-05-20 DIAGNOSIS — R07.9 CHEST PAIN, UNSPECIFIED TYPE: Primary | ICD-10-CM

## 2024-05-20 DIAGNOSIS — G47.33 OSA (OBSTRUCTIVE SLEEP APNEA): ICD-10-CM

## 2024-05-20 DIAGNOSIS — R94.31 ACUTE ELECTROCARDIOGRAM CHANGES: ICD-10-CM

## 2024-05-20 DIAGNOSIS — I20.9 ANGINA PECTORIS (HCC): ICD-10-CM

## 2024-05-20 PROBLEM — I16.0 HYPERTENSIVE URGENCY: Status: ACTIVE | Noted: 2024-05-20

## 2024-05-20 PROBLEM — I20.89 ATYPICAL ANGINA (HCC): Status: ACTIVE | Noted: 2024-05-20

## 2024-05-20 PROBLEM — E78.49 OTHER HYPERLIPIDEMIA: Status: ACTIVE | Noted: 2024-05-20

## 2024-05-20 PROBLEM — E66.8 MODERATE OBESITY: Status: ACTIVE | Noted: 2023-06-08

## 2024-05-20 PROBLEM — E66.9 MODERATE OBESITY: Status: ACTIVE | Noted: 2023-06-08

## 2024-05-20 LAB
ANION GAP SERPL CALCULATED.3IONS-SCNC: 9 MMOL/L (ref 7–16)
BASOPHILS # BLD: 0.08 K/UL (ref 0–0.2)
BASOPHILS NFR BLD: 1 % (ref 0–2)
BUN SERPL-MCNC: 18 MG/DL (ref 6–20)
CALCIUM SERPL-MCNC: 9.1 MG/DL (ref 8.6–10.2)
CHLORIDE SERPL-SCNC: 106 MMOL/L (ref 98–107)
CO2 SERPL-SCNC: 24 MMOL/L (ref 22–29)
CREAT SERPL-MCNC: 1.1 MG/DL (ref 0.7–1.2)
D-DIMER QUANTITATIVE: <200 NG/ML DDU (ref 0–230)
EKG ATRIAL RATE: 62 BPM
EKG P AXIS: 27 DEGREES
EKG P-R INTERVAL: 190 MS
EKG Q-T INTERVAL: 422 MS
EKG QRS DURATION: 94 MS
EKG QTC CALCULATION (BAZETT): 428 MS
EKG R AXIS: -30 DEGREES
EKG T AXIS: 11 DEGREES
EKG VENTRICULAR RATE: 62 BPM
EOSINOPHIL # BLD: 0.08 K/UL (ref 0.05–0.5)
EOSINOPHILS RELATIVE PERCENT: 1 % (ref 0–6)
ERYTHROCYTE [DISTWIDTH] IN BLOOD BY AUTOMATED COUNT: 12.7 % (ref 11.5–15)
GFR, ESTIMATED: 82 ML/MIN/1.73M2
GLUCOSE SERPL-MCNC: 107 MG/DL (ref 74–99)
HCT VFR BLD AUTO: 49 % (ref 37–54)
HGB BLD-MCNC: 16.7 G/DL (ref 12.5–16.5)
IMM GRANULOCYTES # BLD AUTO: 0.05 K/UL (ref 0–0.58)
IMM GRANULOCYTES NFR BLD: 1 % (ref 0–5)
LIPASE SERPL-CCNC: 26 U/L (ref 13–60)
LYMPHOCYTES NFR BLD: 1.61 K/UL (ref 1.5–4)
LYMPHOCYTES RELATIVE PERCENT: 22 % (ref 20–42)
MAGNESIUM SERPL-MCNC: 2.4 MG/DL (ref 1.6–2.6)
MCH RBC QN AUTO: 28.4 PG (ref 26–35)
MCHC RBC AUTO-ENTMCNC: 34.1 G/DL (ref 32–34.5)
MCV RBC AUTO: 83.5 FL (ref 80–99.9)
MONOCYTES NFR BLD: 0.52 K/UL (ref 0.1–0.95)
MONOCYTES NFR BLD: 7 % (ref 2–12)
NEUTROPHILS NFR BLD: 68 % (ref 43–80)
NEUTS SEG NFR BLD: 5.07 K/UL (ref 1.8–7.3)
PLATELET # BLD AUTO: 254 K/UL (ref 130–450)
PMV BLD AUTO: 9.6 FL (ref 7–12)
POTASSIUM SERPL-SCNC: 4.7 MMOL/L (ref 3.5–5)
RBC # BLD AUTO: 5.87 M/UL (ref 3.8–5.8)
SODIUM SERPL-SCNC: 139 MMOL/L (ref 132–146)
TROPONIN I SERPL HS-MCNC: 8 NG/L (ref 0–11)
TROPONIN I SERPL HS-MCNC: 9 NG/L (ref 0–11)
WBC OTHER # BLD: 7.4 K/UL (ref 4.5–11.5)

## 2024-05-20 PROCEDURE — 96372 THER/PROPH/DIAG INJ SC/IM: CPT

## 2024-05-20 PROCEDURE — 80048 BASIC METABOLIC PNL TOTAL CA: CPT

## 2024-05-20 PROCEDURE — G0378 HOSPITAL OBSERVATION PER HR: HCPCS

## 2024-05-20 PROCEDURE — APPSS45 APP SPLIT SHARED TIME 31-45 MINUTES: Performed by: NURSE PRACTITIONER

## 2024-05-20 PROCEDURE — 99285 EMERGENCY DEPT VISIT HI MDM: CPT

## 2024-05-20 PROCEDURE — 6370000000 HC RX 637 (ALT 250 FOR IP)

## 2024-05-20 PROCEDURE — 83735 ASSAY OF MAGNESIUM: CPT

## 2024-05-20 PROCEDURE — APPSS60 APP SPLIT SHARED TIME 46-60 MINUTES

## 2024-05-20 PROCEDURE — 99222 1ST HOSP IP/OBS MODERATE 55: CPT | Performed by: INTERNAL MEDICINE

## 2024-05-20 PROCEDURE — 93005 ELECTROCARDIOGRAM TRACING: CPT | Performed by: EMERGENCY MEDICINE

## 2024-05-20 PROCEDURE — 84484 ASSAY OF TROPONIN QUANT: CPT

## 2024-05-20 PROCEDURE — 6360000002 HC RX W HCPCS: Performed by: INTERNAL MEDICINE

## 2024-05-20 PROCEDURE — 85379 FIBRIN DEGRADATION QUANT: CPT

## 2024-05-20 PROCEDURE — 93010 ELECTROCARDIOGRAM REPORT: CPT | Performed by: INTERNAL MEDICINE

## 2024-05-20 PROCEDURE — 71046 X-RAY EXAM CHEST 2 VIEWS: CPT

## 2024-05-20 PROCEDURE — 99223 1ST HOSP IP/OBS HIGH 75: CPT | Performed by: INTERNAL MEDICINE

## 2024-05-20 PROCEDURE — 2580000003 HC RX 258: Performed by: INTERNAL MEDICINE

## 2024-05-20 PROCEDURE — 83690 ASSAY OF LIPASE: CPT

## 2024-05-20 PROCEDURE — 85025 COMPLETE CBC W/AUTO DIFF WBC: CPT

## 2024-05-20 RX ORDER — POTASSIUM CHLORIDE 20 MEQ/1
40 TABLET, EXTENDED RELEASE ORAL PRN
Status: DISCONTINUED | OUTPATIENT
Start: 2024-05-20 | End: 2024-05-21 | Stop reason: HOSPADM

## 2024-05-20 RX ORDER — SODIUM CHLORIDE 0.9 % (FLUSH) 0.9 %
5-40 SYRINGE (ML) INJECTION PRN
Status: DISCONTINUED | OUTPATIENT
Start: 2024-05-20 | End: 2024-05-21 | Stop reason: HOSPADM

## 2024-05-20 RX ORDER — CHLORAL HYDRATE 500 MG
1000 CAPSULE ORAL EVERY MORNING
COMMUNITY

## 2024-05-20 RX ORDER — AMLODIPINE BESYLATE 10 MG/1
10 TABLET ORAL DAILY
Status: DISCONTINUED | OUTPATIENT
Start: 2024-05-20 | End: 2024-05-21 | Stop reason: HOSPADM

## 2024-05-20 RX ORDER — SODIUM CHLORIDE 9 MG/ML
INJECTION, SOLUTION INTRAVENOUS PRN
Status: DISCONTINUED | OUTPATIENT
Start: 2024-05-20 | End: 2024-05-21 | Stop reason: HOSPADM

## 2024-05-20 RX ORDER — SODIUM CHLORIDE 0.9 % (FLUSH) 0.9 %
5-40 SYRINGE (ML) INJECTION EVERY 12 HOURS SCHEDULED
Status: DISCONTINUED | OUTPATIENT
Start: 2024-05-20 | End: 2024-05-21 | Stop reason: HOSPADM

## 2024-05-20 RX ORDER — POLYETHYLENE GLYCOL 3350 17 G/17G
17 POWDER, FOR SOLUTION ORAL DAILY PRN
Status: DISCONTINUED | OUTPATIENT
Start: 2024-05-20 | End: 2024-05-21 | Stop reason: HOSPADM

## 2024-05-20 RX ORDER — MULTIVIT-MIN/IRON/FA/P.GINSENG 18-0.4-5
2 TABLET ORAL EVERY MORNING
COMMUNITY

## 2024-05-20 RX ORDER — FENTANYL CITRATE 50 UG/ML
50 INJECTION, SOLUTION INTRAMUSCULAR; INTRAVENOUS ONCE
Status: DISCONTINUED | OUTPATIENT
Start: 2024-05-20 | End: 2024-05-21 | Stop reason: HOSPADM

## 2024-05-20 RX ORDER — LOSARTAN POTASSIUM 100 MG/1
100 TABLET ORAL EVERY MORNING
COMMUNITY

## 2024-05-20 RX ORDER — ONDANSETRON 2 MG/ML
4 INJECTION INTRAMUSCULAR; INTRAVENOUS EVERY 6 HOURS PRN
Status: DISCONTINUED | OUTPATIENT
Start: 2024-05-20 | End: 2024-05-21 | Stop reason: HOSPADM

## 2024-05-20 RX ORDER — MAGNESIUM SULFATE IN WATER 40 MG/ML
2000 INJECTION, SOLUTION INTRAVENOUS PRN
Status: DISCONTINUED | OUTPATIENT
Start: 2024-05-20 | End: 2024-05-21 | Stop reason: HOSPADM

## 2024-05-20 RX ORDER — NITROGLYCERIN 0.4 MG/1
0.4 TABLET SUBLINGUAL EVERY 5 MIN PRN
Status: DISCONTINUED | OUTPATIENT
Start: 2024-05-20 | End: 2024-05-21 | Stop reason: HOSPADM

## 2024-05-20 RX ORDER — ENOXAPARIN SODIUM 100 MG/ML
30 INJECTION SUBCUTANEOUS 2 TIMES DAILY
Status: DISCONTINUED | OUTPATIENT
Start: 2024-05-20 | End: 2024-05-21 | Stop reason: HOSPADM

## 2024-05-20 RX ORDER — ONDANSETRON 4 MG/1
4 TABLET, ORALLY DISINTEGRATING ORAL EVERY 8 HOURS PRN
Status: DISCONTINUED | OUTPATIENT
Start: 2024-05-20 | End: 2024-05-21 | Stop reason: HOSPADM

## 2024-05-20 RX ORDER — POTASSIUM CHLORIDE 7.45 MG/ML
10 INJECTION INTRAVENOUS PRN
Status: DISCONTINUED | OUTPATIENT
Start: 2024-05-20 | End: 2024-05-21 | Stop reason: HOSPADM

## 2024-05-20 RX ORDER — ASPIRIN 81 MG/1
324 TABLET, CHEWABLE ORAL ONCE
Status: COMPLETED | OUTPATIENT
Start: 2024-05-20 | End: 2024-05-20

## 2024-05-20 RX ORDER — LOSARTAN POTASSIUM 50 MG/1
100 TABLET ORAL EVERY MORNING
Status: DISCONTINUED | OUTPATIENT
Start: 2024-05-21 | End: 2024-05-21 | Stop reason: HOSPADM

## 2024-05-20 RX ADMIN — ENOXAPARIN SODIUM 30 MG: 100 INJECTION SUBCUTANEOUS at 20:16

## 2024-05-20 RX ADMIN — SODIUM CHLORIDE, PRESERVATIVE FREE 10 ML: 5 INJECTION INTRAVENOUS at 20:16

## 2024-05-20 RX ADMIN — LIDOCAINE HYDROCHLORIDE: 20 SOLUTION ORAL at 11:43

## 2024-05-20 RX ADMIN — ASPIRIN 81 MG CHEWABLE TABLET 324 MG: 81 TABLET CHEWABLE at 11:42

## 2024-05-20 RX ADMIN — AMLODIPINE BESYLATE 10 MG: 10 TABLET ORAL at 16:28

## 2024-05-20 ASSESSMENT — LIFESTYLE VARIABLES
HOW OFTEN DO YOU HAVE A DRINK CONTAINING ALCOHOL: NEVER
HOW MANY STANDARD DRINKS CONTAINING ALCOHOL DO YOU HAVE ON A TYPICAL DAY: PATIENT DOES NOT DRINK

## 2024-05-20 ASSESSMENT — PAIN SCALES - GENERAL
PAINLEVEL_OUTOF10: 1
PAINLEVEL_OUTOF10: 3
PAINLEVEL_OUTOF10: 2
PAINLEVEL_OUTOF10: 1

## 2024-05-20 ASSESSMENT — PAIN - FUNCTIONAL ASSESSMENT
PAIN_FUNCTIONAL_ASSESSMENT: 0-10
PAIN_FUNCTIONAL_ASSESSMENT: ACTIVITIES ARE NOT PREVENTED

## 2024-05-20 ASSESSMENT — ENCOUNTER SYMPTOMS
CONSTIPATION: 0
VOMITING: 0
DIARRHEA: 0
NAUSEA: 0
ABDOMINAL PAIN: 1
BLOOD IN STOOL: 0
SORE THROAT: 1
SHORTNESS OF BREATH: 0
WHEEZING: 0
BACK PAIN: 1
COUGH: 0

## 2024-05-20 ASSESSMENT — PAIN DESCRIPTION - PAIN TYPE: TYPE: ACUTE PAIN

## 2024-05-20 ASSESSMENT — PAIN DESCRIPTION - FREQUENCY: FREQUENCY: INTERMITTENT

## 2024-05-20 ASSESSMENT — HEART SCORE
ECG: NON-SPECIFC REPOLARIZATION DISTURBANCE/LBTB/PM
ECG: NON-SPECIFC REPOLARIZATION DISTURBANCE/LBTB/PM

## 2024-05-20 ASSESSMENT — PAIN DESCRIPTION - ORIENTATION
ORIENTATION: MID
ORIENTATION: RIGHT

## 2024-05-20 ASSESSMENT — PAIN DESCRIPTION - LOCATION
LOCATION: CHEST
LOCATION: CHEST

## 2024-05-20 ASSESSMENT — PAIN DESCRIPTION - DESCRIPTORS: DESCRIPTORS: SORE

## 2024-05-20 NOTE — ED PROVIDER NOTES
Department of Emergency Medicine   ED  Provider Note    Pt Name: Reji De León         MRN: [id number]         Birthdate 1975    Admit Date/RoomTime: 5/20/2024 10:22 AM  ED Room: 08/08      Chief Complaint   Patient presents with    Chest Pain     Patient is coming in from work. Pt states he is having chest pain, throat tightness and bloating. Patient also states he was having significant episodes of sweating and is feeling weak.    Abdominal Pain        The history is provided by the patient.        Reji De León is a 49 y.o. male with PMHx of hypertension, hyper lipidemia, former tobacco chewer presenting to the ED for chest pain.  Patient states that he has had some left shoulder discomfort for the last couple weeks and was referred to a cardiologist by his PCP due to concern of may be cardiac in origin.  He said that the pain was just slightly nagging in the left shoulder however this morning at work he began to have pretty severe left-sided chest pain that went up into his neck that made his throat feels sore and tight.  He also noted that he was very sweaty and feeling weak at that time.  Patient states now in the ER currently has 2 out of 10 discomfort in his left shoulder and some feeling of neck soreness and abdominal soreness.  Patient does not have any cardiac history however stated his father did have a valve replacement.  Patient is not diabetic but does have a large family history.  Patient also has noted that his blood pressure has been higher during this time in the 170s over 90s.  Of note patient recently established with cardiologist and has stress test planned for tomorrow    Suspect chest pain of cardiac origin    Review of Systems   Constitutional:  Positive for diaphoresis. Negative for activity change, appetite change, chills, fatigue and fever.   HENT:  Positive for sore throat. Negative for congestion.    Respiratory:  Negative for cough, shortness of breath and wheezing.    Cardiovascular:

## 2024-05-20 NOTE — CONSULTS
Inpatient Cardiology Consultation      Reason for Consult:  Chest Pain     Consulting Physician: Dr. Roman     Requesting Physician:  Dr. Merida     Date of Consultation: 5/20/2024    History of Present Illness:   Reji De León is a 49-year-old male known to [facility] cardiology through Dr. Heller.  He established with a new patient on 5/13/2024 as he was having complaints of chest pain.  He was to have an outpatient exercise nuclear stress test which has not yet been completed.    He presented to the emergency department on 5/20/2024 with complaints of increasing left shoulder pain with severe left-sided chest pain that went up in his neck and made his back feels sore and tight as well as experiencing diaphoresis.  Blood pressure on presentation was elevated 215/111, heart rate 73, 99% on room air.  In the ED he did receive aspirin 324 mg as well as a GI cocktail.  Initial labs include sodium 139, potassium 4.7 BUN/creatinine 18/1.1, magnesium 2.4, troponin 9> 8, Hgb 16.7.  Chest x-ray reveals no acute process.    Patient is lying in bed at the time of examination and appears to be in no acute distress. 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 The pain was slightly resolved by the GI cocktail he received in the ED. He is currently pain free. He does report his blood pressure has been higher than normal at home. He denies any SOB, lightheadedness, dizziness, syncope, PND or orthopnea.       Please note: past medical records were reviewed per electronic medical record (EMR) - see detailed reports under Past Medical/ Surgical History.     Past Medical History:    Hypertension  Hyperlipidemia  Former tobacco use -discontinued chewing tobacco 4 weeks ago  Obesity - BMI 34.58      Past Surgical History:    No past surgical history on file.        Medications Prior to

## 2024-05-20 NOTE — H&P
[facility] Hospitalist Group   History and Physical      CHIEF COMPLAINT:  CP    History of Present Illness:  49 y.o. male with a history of  HTN, Vitamin D Deficiency Tobacco Use presents with CP. Pt states he developed chest achiness last night.  States he is sore all over.  States last week while mowing the grass he developed increased shortness of breath with significant sweating.  Notes numbness to his left fingers.  States he feels as if he is bloated.  Does have a history of hypertension on losartan/Lopressor, however patient states BP is not very well-controlled.  He typically runs on the higher side.  Admits to alcohol use socially, denies smoking cessation chewing tobacco approximately 4 weeks ago.  States had history of heart disease.  He denies any fevers, chills, N/V/D Pt received GI cocktail,  mg in ED course.  Decision to admit patient for further evaluation and treatment.    Informant(s) for H&P:Pt and EMR    REVIEW OF SYSTEMS:  Admits to CP.  Denies fevers, chills, cp, n/v, ha, vision/hearing changes, wt changes, hot/cold flashes, other open skin lesions, diarrhea, constipation, dysuria/hematuria unless noted in HPI. Complete ROS performed with the patient and is otherwise negative.      PMH:  No past medical history on file.    Surgical History:  No past surgical history on file.    Medications Prior to Admission:    Prior to Admission medications    Medication Sig Start Date End Date Taking? Authorizing Provider   losartan (COZAAR) 100 MG tablet Take 1 tablet by mouth every morning   Yes Miky Brown MD   Specialty Vitamins Products ([facility] ENERGY METABOLISM) TABS Take 2 tablets by mouth every morning   Yes Miky Brown MD   Omega-3 Fatty Acids (FISH OIL) 1000 MG capsule Take 1 capsule by mouth every morning   Yes Miky Brown MD   metoprolol tartrate (LOPRESSOR) 25 MG tablet Take 1 tablet by mouth 2 times daily 6/8/23   Paulo

## 2024-05-20 NOTE — PLAN OF CARE
Problem: Discharge Planning  Goal: Discharge to home or other facility with appropriate resources  Outcome: Progressing  Flowsheets (Taken 5/20/2024 1114 by Keri Mcbride, RN)  Discharge to home or other facility with appropriate resources: Refer to discharge planning if patient needs post-hospital services based on physician order or complex needs related to functional status, cognitive ability or social support system

## 2024-05-20 NOTE — PROGRESS NOTES
4 Eyes Skin Assessment     NAME:  Reji De León  YOB: 1975  MEDICAL RECORD NUMBER:  [id number]    The patient is being assessed for  Admission    I agree that at least one RN has performed a thorough Head to Toe Skin Assessment on the patient. ALL assessment sites listed below have been assessed.      Areas assessed by both nurses:    Head, Face, Ears, Shoulders, Back, Chest, Arms, Elbows, Hands, Sacrum. Buttock, Coccyx, Ischium, and Legs. Feet and Heels        Does the Patient have a Wound? No noted wound(s)       Rafa Prevention initiated by RN: No  Wound Care Orders initiated by RN: No    Pressure Injury (Stage 3,4, Unstageable, DTI, NWPT, and Complex wounds) if present, place Wound referral order by RN under : No    New Ostomies, if present place, Ostomy referral order under : No     Nurse 1 eSignature: Electronically signed by Jolly Palencia RN on 5/20/24 at 5:47 PM EDT    **SHARE this note so that the co-signing nurse can place an eSignature**    Nurse 2 eSignature: Electronically signed by Vanessa Mosquera RN on 5/20/24 at 5:47 PM EDT

## 2024-05-20 NOTE — ED TRIAGE NOTES
FIRST PROVIDER CONTACT ASSESSMENT NOTE       Department of Emergency Medicine                 First Provider Note            24  10:19 AM EDT    Date of Encounter: No admission date for patient encounter.    Patient Name: Reji De León  : 1975  MRN: [id number]    Chief Complaint: Chest Pain (Patient is coming in from work. Pt states he is having chest pain, throat tightness and bloating. Patient also states he was having significant episodes of sweating and is feeling weak.) and Abdominal Pain      History of Present Illness:   Reji De León is a 49 y.o. male who presents to the ED for Patient complains of CP and tightness in his throat.  Patient feels weak.  He feels pressure in his upper abdomen.   Patient is scheduled for a stress test tomorrow with Dr. Heller.       Focused Physical Exam:  VS:    ED Triage Vitals   BP Temp Temp src Pulse Resp SpO2 Height Weight   -- -- -- -- -- -- -- --        Physical Ex: Constitutional: Alert and non-toxic.    Medical History:  has no past medical history on file.  Surgical History:  has no past surgical history on file.  Social History:  reports that he has never smoked. His smokeless tobacco use includes snuff. He reports current alcohol use. He reports that he does not use drugs.  Family History: family history includes Diabetes in his mother; Heart Disease in his father; High Blood Pressure in his brother, father, and mother; High Cholesterol in his mother; Stroke in his father.    Allergies: Amoxicillin, Cashew nut (anacardium occidentale) skin test, and Other     Initial Plan of Care: Initiate Treatment-Testing, Proceed toTreatment Area When Bed Available for ED Attending/MLP to Continue Care      ---END OF FIRST PROVIDER CONTACT ASSESSMENT NOTE---  Electronically signed by WINSOME Nix   DD: 24

## 2024-05-20 NOTE — TELEPHONE ENCOUNTER
Attempted to call patient re: stress test tomorrow as it looks like he is currently in ED.  Unable to reach patient and unable to leave message.  Emergency contact unable to be reached as well.

## 2024-05-20 NOTE — ED NOTES
.ED to Inpatient Handoff Report    Notified Arabella that electronic handoff available and patient ready for transport to room 613.    Safety Risks: None identified    Patient in Restraints: no    Constant Observer or Patient : no    Telemetry Monitoring Ordered: Yes          Order to transfer to unit without monitor: NO    Last MEWS: 1 Time completed: 1353    Deterioration Index: 18.92    Vitals:    05/20/24 1126 05/20/24 1235 05/20/24 1317 05/20/24 1353   BP: (!) 162/91 (!) 159/94 (!) 160/88 (!) 172/97   Pulse: 66 61 68 65   Resp: 14 14 16 16   Temp:    98.8 °F (37.1 °C)   TempSrc:    Oral   SpO2: 99% 96% 97% 99%   Weight:       Height:           Opportunity for questions and clarification was provided.

## 2024-05-21 ENCOUNTER — APPOINTMENT (OUTPATIENT)
Dept: NUCLEAR MEDICINE | Age: 49
End: 2024-05-21
Payer: COMMERCIAL

## 2024-05-21 ENCOUNTER — APPOINTMENT (OUTPATIENT)
Age: 49
End: 2024-05-21
Payer: COMMERCIAL

## 2024-05-21 ENCOUNTER — APPOINTMENT (OUTPATIENT)
Age: 49
End: 2024-05-21
Attending: INTERNAL MEDICINE
Payer: COMMERCIAL

## 2024-05-21 VITALS
OXYGEN SATURATION: 98 % | SYSTOLIC BLOOD PRESSURE: 158 MMHG | HEART RATE: 115 BPM | TEMPERATURE: 98.2 F | DIASTOLIC BLOOD PRESSURE: 93 MMHG | HEIGHT: 72 IN | RESPIRATION RATE: 16 BRPM | WEIGHT: 255 LBS | BODY MASS INDEX: 34.54 KG/M2

## 2024-05-21 PROBLEM — Q24.8 LEFT VENTRICULAR OUTFLOW TRACT OBSTRUCTION: Status: ACTIVE | Noted: 2024-05-21

## 2024-05-21 LAB
ANION GAP SERPL CALCULATED.3IONS-SCNC: 13 MMOL/L (ref 7–16)
BUN SERPL-MCNC: 15 MG/DL (ref 6–20)
CALCIUM SERPL-MCNC: 8.9 MG/DL (ref 8.6–10.2)
CHLORIDE SERPL-SCNC: 102 MMOL/L (ref 98–107)
CO2 SERPL-SCNC: 21 MMOL/L (ref 22–29)
CREAT SERPL-MCNC: 1.2 MG/DL (ref 0.7–1.2)
ECHO AO ASC DIAM: 3.6 CM
ECHO AO ASCENDING AORTA INDEX: 1.53 CM/M2
ECHO AV MEAN GRADIENT: 8 MMHG
ECHO AV MEAN VELOCITY: 1.3 M/S
ECHO AV PEAK GRADIENT: 13 MMHG
ECHO AV PEAK VELOCITY: 1.8 M/S
ECHO AV VELOCITY RATIO: 1
ECHO AV VTI: 23.2 CM
ECHO BSA: 2.42 M2
ECHO BSA: 2.42 M2
ECHO LA VOL A-L A2C: 59 ML (ref 18–58)
ECHO LA VOL A-L A4C: 46 ML (ref 18–58)
ECHO LA VOL MOD A2C: 56 ML (ref 18–58)
ECHO LA VOL MOD A4C: 44 ML (ref 18–58)
ECHO LA VOLUME AREA LENGTH: 54 ML
ECHO LA VOLUME INDEX A-L A2C: 25 ML/M2 (ref 16–34)
ECHO LA VOLUME INDEX A-L A4C: 19 ML/M2 (ref 16–34)
ECHO LA VOLUME INDEX AREA LENGTH: 23 ML/M2 (ref 16–34)
ECHO LA VOLUME INDEX MOD A2C: 24 ML/M2 (ref 16–34)
ECHO LA VOLUME INDEX MOD A4C: 19 ML/M2 (ref 16–34)
ECHO LVOT AV VTI INDEX: 1.26
ECHO LVOT MEAN GRADIENT: 7 MMHG
ECHO LVOT PEAK GRADIENT: 13 MMHG
ECHO LVOT PEAK VELOCITY: 1.8 M/S
ECHO LVOT VTI: 29.3 CM
ECHO MV AREA PHT: 7 CM2
ECHO MV LVOT VTI INDEX: 0.69
ECHO MV MAX VELOCITY: 1.5 M/S
ECHO MV MEAN GRADIENT: 5 MMHG
ECHO MV MEAN VELOCITY: 1.1 M/S
ECHO MV PEAK GRADIENT: 9 MMHG
ECHO MV PRESSURE HALF TIME (PHT): 31.6 MS
ECHO MV VTI: 20.3 CM
ERYTHROCYTE [DISTWIDTH] IN BLOOD BY AUTOMATED COUNT: 12.9 % (ref 11.5–15)
GFR, ESTIMATED: 78 ML/MIN/1.73M2
GLUCOSE SERPL-MCNC: 98 MG/DL (ref 74–99)
HCT VFR BLD AUTO: 48.3 % (ref 37–54)
HGB BLD-MCNC: 16.1 G/DL (ref 12.5–16.5)
MCH RBC QN AUTO: 27.5 PG (ref 26–35)
MCHC RBC AUTO-ENTMCNC: 33.3 G/DL (ref 32–34.5)
MCV RBC AUTO: 82.6 FL (ref 80–99.9)
NUC STRESS EJECTION FRACTION: 75 %
PLATELET # BLD AUTO: 267 K/UL (ref 130–450)
PMV BLD AUTO: 9.6 FL (ref 7–12)
POTASSIUM SERPL-SCNC: 4.2 MMOL/L (ref 3.5–5)
RBC # BLD AUTO: 5.85 M/UL (ref 3.8–5.8)
SODIUM SERPL-SCNC: 136 MMOL/L (ref 132–146)
STRESS BASELINE DIAS BP: 89 MMHG
STRESS BASELINE HR: 99 BPM
STRESS BASELINE SYS BP: 157 MMHG
STRESS ESTIMATED WORKLOAD: 10 METS
STRESS PEAK DIAS BP: 73 MMHG
STRESS PEAK SYS BP: 218 MMHG
STRESS PERCENT HR ACHIEVED: 103 %
STRESS POST PEAK HR: 176 BPM
STRESS RATE PRESSURE PRODUCT: NORMAL BPM*MMHG
STRESS TARGET HR: 171 BPM
TID: 1.03
WBC OTHER # BLD: 11.4 K/UL (ref 4.5–11.5)

## 2024-05-21 PROCEDURE — 93017 CV STRESS TEST TRACING ONLY: CPT

## 2024-05-21 PROCEDURE — 93306 TTE W/DOPPLER COMPLETE: CPT

## 2024-05-21 PROCEDURE — 93306 TTE W/DOPPLER COMPLETE: CPT | Performed by: INTERNAL MEDICINE

## 2024-05-21 PROCEDURE — 78452 HT MUSCLE IMAGE SPECT MULT: CPT

## 2024-05-21 PROCEDURE — 6370000000 HC RX 637 (ALT 250 FOR IP)

## 2024-05-21 PROCEDURE — 6370000000 HC RX 637 (ALT 250 FOR IP): Performed by: INTERNAL MEDICINE

## 2024-05-21 PROCEDURE — 99239 HOSP IP/OBS DSCHRG MGMT >30: CPT | Performed by: INTERNAL MEDICINE

## 2024-05-21 PROCEDURE — G0378 HOSPITAL OBSERVATION PER HR: HCPCS

## 2024-05-21 PROCEDURE — 3430000000 HC RX DIAGNOSTIC RADIOPHARMACEUTICAL: Performed by: RADIOLOGY

## 2024-05-21 PROCEDURE — 85027 COMPLETE CBC AUTOMATED: CPT

## 2024-05-21 PROCEDURE — A9500 TC99M SESTAMIBI: HCPCS | Performed by: RADIOLOGY

## 2024-05-21 PROCEDURE — 2580000003 HC RX 258: Performed by: INTERNAL MEDICINE

## 2024-05-21 PROCEDURE — 96372 THER/PROPH/DIAG INJ SC/IM: CPT

## 2024-05-21 PROCEDURE — 36415 COLL VENOUS BLD VENIPUNCTURE: CPT

## 2024-05-21 PROCEDURE — 6360000002 HC RX W HCPCS: Performed by: INTERNAL MEDICINE

## 2024-05-21 PROCEDURE — 80048 BASIC METABOLIC PNL TOTAL CA: CPT

## 2024-05-21 PROCEDURE — 99233 SBSQ HOSP IP/OBS HIGH 50: CPT | Performed by: INTERNAL MEDICINE

## 2024-05-21 RX ORDER — AMLODIPINE BESYLATE 10 MG/1
10 TABLET ORAL DAILY
Qty: 30 TABLET | Refills: 3 | Status: SHIPPED | OUTPATIENT
Start: 2024-05-22

## 2024-05-21 RX ORDER — HYDROCHLOROTHIAZIDE 12.5 MG/1
12.5 TABLET ORAL DAILY
Status: DISCONTINUED | OUTPATIENT
Start: 2024-05-21 | End: 2024-05-21 | Stop reason: HOSPADM

## 2024-05-21 RX ORDER — TETRAKIS(2-METHOXYISOBUTYLISOCYANIDE)COPPER(I) TETRAFLUOROBORATE 1 MG/ML
30 INJECTION, POWDER, LYOPHILIZED, FOR SOLUTION INTRAVENOUS
Status: COMPLETED | OUTPATIENT
Start: 2024-05-21 | End: 2024-05-21

## 2024-05-21 RX ORDER — HYDROCHLOROTHIAZIDE 12.5 MG/1
12.5 TABLET ORAL DAILY
Qty: 30 TABLET | Refills: 3 | Status: SHIPPED | OUTPATIENT
Start: 2024-05-22

## 2024-05-21 RX ORDER — CARVEDILOL 6.25 MG/1
6.25 TABLET ORAL 2 TIMES DAILY
Qty: 60 TABLET | Refills: 3 | Status: SHIPPED | OUTPATIENT
Start: 2024-05-21

## 2024-05-21 RX ORDER — TETRAKIS(2-METHOXYISOBUTYLISOCYANIDE)COPPER(I) TETRAFLUOROBORATE 1 MG/ML
10 INJECTION, POWDER, LYOPHILIZED, FOR SOLUTION INTRAVENOUS
Status: COMPLETED | OUTPATIENT
Start: 2024-05-21 | End: 2024-05-21

## 2024-05-21 RX ADMIN — ENOXAPARIN SODIUM 30 MG: 100 INJECTION SUBCUTANEOUS at 08:22

## 2024-05-21 RX ADMIN — Medication 30 MILLICURIE: at 09:45

## 2024-05-21 RX ADMIN — SODIUM CHLORIDE, PRESERVATIVE FREE 10 ML: 5 INJECTION INTRAVENOUS at 08:24

## 2024-05-21 RX ADMIN — Medication 10 MILLICURIE: at 08:41

## 2024-05-21 RX ADMIN — AMLODIPINE BESYLATE 10 MG: 10 TABLET ORAL at 08:22

## 2024-05-21 RX ADMIN — HYDROCHLOROTHIAZIDE 12.5 MG: 12.5 TABLET ORAL at 11:14

## 2024-05-21 RX ADMIN — LOSARTAN POTASSIUM 100 MG: 50 TABLET, FILM COATED ORAL at 08:21

## 2024-05-21 NOTE — PROGRESS NOTES
CLINICAL PHARMACY NOTE: MEDS TO BEDS    Total # of Prescriptions Filled: 3   The following medications were delivered to the patient:  NORVASC 10 MG  CARVEDILOL 6.25 MG  HYDROCHLOROTHIAZIDE 12.5 MG    Additional Documentation:

## 2024-05-21 NOTE — PROGRESS NOTES
INPATIENT CARDIOLOGY FOLLOW-UP    Name: Reji De León    Age: 49 y.o.    Date of Admission: 5/20/2024 10:22 AM    Date of Service: 5/21/2024    Primary Cardiologist: New to me    Chief Complaint: Follow-up for atypical angina, hypertensive urgency    Interim History:  No new overnight cardiac complaints. Currently with no complaints of CP, SOB, palpitations, dizziness, or lightheadedness. SR on telemetry.    Underwent echocardiogram as well as nuclear perfusion scan today.  Feeling better overall.  Pressures are elevated but better than before    Review of Systems:   Negative except as described above    Problem List:  Patient Active Problem List   Diagnosis    Essential hypertension    Moderate obesity    Chest pain    Atypical angina (HCC)    Hypertensive urgency    Other hyperlipidemia       Current Medications:    Current Facility-Administered Medications:     [COMPLETED] technetium sestamibi (CARDIOLITE) injection 30 millicurie, 30 millicurie, IntraVENous, ONCE PRN, Herb Armendariz MD, 30 millicurie at 05/21/24 0945    nitroGLYCERIN (NITROSTAT) SL tablet 0.4 mg, 0.4 mg, SubLINGual, Q5 Min PRN, Chon Key MD    fentaNYL (SUBLIMAZE) injection 50 mcg, 50 mcg, IntraVENous, Once, Uday Coats DO    losartan (COZAAR) tablet 100 mg, 100 mg, Oral, QAMFuad Ahmad A, MD, 100 mg at 05/21/24 0821    sodium chloride flush 0.9 % injection 5-40 mL, 5-40 mL, IntraVENous, 2 times per day, Candy Merida MD, 10 mL at 05/21/24 0824    sodium chloride flush 0.9 % injection 5-40 mL, 5-40 mL, IntraVENous, PRN, Candy Merida MD    0.9 % sodium chloride infusion, , IntraVENous, PRN, Candy Merida MD    potassium chloride (KLOR-CON M) extended release tablet 40 mEq, 40 mEq, Oral, PRN **OR** potassium bicarb-citric acid (EFFER-K) effervescent tablet 40 mEq, 40 mEq, Oral, PRN **OR** potassium chloride 10 mEq/100 mL IVPB (Peripheral Line), 10 mEq, IntraVENous, PRN, Candy Merida MD    magnesium sulfate 2000 mg in

## 2024-05-21 NOTE — PLAN OF CARE
Problem: Discharge Planning  Goal: Discharge to home or other facility with appropriate resources  5/21/2024 1442 by Malathi Johnson, RN  Outcome: Completed  5/21/2024 0925 by Malathi Johnson, RN  Outcome: Progressing     Problem: Pain  Goal: Verbalizes/displays adequate comfort level or baseline comfort level  5/21/2024 1442 by Malathi Johnson, RN  Outcome: Completed  5/21/2024 0925 by Malathi Johnson, RN  Outcome: Progressing

## 2024-05-21 NOTE — CARE COORDINATION
Introduced my self and provided explanation of CM role to patient.  Admitted for chest pain. Hx of HTN. Cardiology is following ECHO and stress test results pending. He voices he resides in a 1 story home with his spouse and child and completes his adl's with independence w/o assistive devices. Patient is established with Dr. Bates and gamaliel [facility] in [place]. Patient denies any needs at this time, will continue to follow.  Kiah MOHRN, RN  Case Management

## 2024-05-21 NOTE — DISCHARGE SUMMARY
[facility] Hospitalist Physician Discharge Summary       No follow-up provider specified.    Activity level: As tolerated     Dispo: Home      Condition on discharge: Stable     Patient ID:  Reji De León  [id number]  49 y.o.  1975    Admit date: 5/20/2024    Discharge date and time:  5/21/2024  2:41 PM    Admission Diagnoses: Principal Problem:    Chest pain  Active Problems:    Moderate obesity    Atypical angina (HCC)    Hypertensive urgency    Other hyperlipidemia  Resolved Problems:    * No resolved hospital problems. *      Discharge Diagnoses: Principal Problem:    Chest pain  Active Problems:    Moderate obesity    Atypical angina (HCC)    Hypertensive urgency    Other hyperlipidemia  Resolved Problems:    * No resolved hospital problems. *      Consults:  IP CONSULT TO CARDIOLOGY    Hospital Course:   Patient Reji De León is a 49 y.o. presented with Angina pectoris (HCC) [I20.9]  Acute chest pain [R07.9]  Acute electrocardiogram changes [R94.31]  Chest pain, unspecified type [R07.9]    Patient is a 49-year-old male who was admitted due to chest pain.  Given his risk factors of obesity, tobacco use, HLD, HTN cardiology was consulted recommended getting an echo and stress test.  Both test came unremarkable.  Patient remained chest pain-free while here.  New antihypertensives added by cardiology.  Patient will be discharged home in stable condition.    Discharge Exam:    General Appearance: alert and oriented to person, place and time and in no acute distress  Skin: warm and dry  Head: normocephalic and atraumatic  Eyes: pupils equal, round, and reactive to light, extraocular eye movements intact, conjunctivae normal  Neck: neck supple and non tender without mass   Pulmonary/Chest: clear to auscultation bilaterally- no wheezes, rales or rhonchi, normal air movement, no respiratory distress  Cardiovascular: normal rate, normal S1 and S2 and no carotid bruits  Abdomen: soft, non-tender, non-distended,

## 2024-05-22 ENCOUNTER — CARE COORDINATION (OUTPATIENT)
Dept: CARE COORDINATION | Age: 49
End: 2024-05-22

## 2024-05-22 ENCOUNTER — TELEPHONE (OUTPATIENT)
Dept: ADMINISTRATIVE | Age: 49
End: 2024-05-22

## 2024-05-22 DIAGNOSIS — R07.9 CHEST PAIN, UNSPECIFIED TYPE: Primary | ICD-10-CM

## 2024-05-22 NOTE — CARE COORDINATION
Care Transitions Note    Initial Call - Call within 2 business days of discharge: Yes     Patient Current Location:  Home: [address]  [facility] [address]    Care Transition Nurse contacted the patient by telephone to perform post hospital discharge assessment, verified name and  as identifiers. Provided introduction to self, and explanation of the Care Transition Nurse role.     Patient: Reji De León    Patient : 1975   MRN: [id number]    Reason for Admission: CP  Discharge Date: 24  RURS: No data recorded    Last Discharge Facility       Date Complaint Diagnosis Description Type Department Provider    24 Chest Pain; Abdominal Pain Chest pain, unspecified type ... ED to Hosp-Admission (Discharged) (ADMITTED) Candy Whittaker MD; Manny Coats...          Care Summary Note:     Spoke with Reji for initial observation status care transition call post hospital discharge. He reports feeling \"good\" today. He denies any recurrence of CP, denies chest tightness, SOB, REESE, HA, or blurry vision. He reports taking a few days off of work to rest and get used to the newly ordered medications. His BP this morning was 148/88.    Kindly declined scheduling a sooner appointment than his previously scheduled appointment on 24.   He plans to call Dr. Gonzalez office today regarding cardio follow up.    Reji denies any needs, questions, or concerns at this time.     Care Transition Nurse reviewed discharge instructions, medical action plan, and red flags with patient. The patient was given an opportunity to ask questions; no further questions or concerns at this time.. The patient verbalized understanding.   Were discharge instructions available to patient? Yes.   Reviewed appropriate site of care based on symptoms and resources available to patient including: PCP  Specialist. The patient agrees to contact the primary care provider and/or specialist office for questions related to their healthcare.

## 2024-05-29 ENCOUNTER — CARE COORDINATION (OUTPATIENT)
Dept: CARE COORDINATION | Age: 49
End: 2024-05-29

## 2024-05-29 NOTE — CARE COORDINATION
Care Transitions Note    Follow Up Call      Attempted to reach the patient for subsequent Care Transition call. HIPAA compliant message left with CTN's contact information requesting return phone call.    Follow Up Appointment:   Future Appointments         Provider Specialty Dept Phone    6/11/2024 8:00 AM SCHEDULE, MITCH ALBERTO Family Medicine [phone]    6/13/2024 3:30 PM Virgilio Bates APRN - CNP Family Medicine [phone]    8/5/2024 7:30 AM Kaushik Heller MD Cardiology [phone]        Blanche Weeks RN

## 2024-06-07 ENCOUNTER — CARE COORDINATION (OUTPATIENT)
Dept: CARE COORDINATION | Age: 49
End: 2024-06-07

## 2024-06-07 NOTE — CARE COORDINATION
Care Transitions Note  Follow Up Call     Final attempt to reach the patient for subsequent Care Transition call. HIPAA compliant message left with CTN's contact information requesting return phone call. Will sign off.      Follow Up Appointment:   Future Appointments         Provider Specialty Dept Phone    6/13/2024 3:30 PM Virgilio Bates APRN - CNP Family Medicine [phone]    8/5/2024 7:30 AM Kaushik Heller MD Cardiology [phone]        Blanche Weeks RN

## 2024-06-13 ENCOUNTER — OFFICE VISIT (OUTPATIENT)
Dept: FAMILY MEDICINE CLINIC | Age: 49
End: 2024-06-13
Payer: COMMERCIAL

## 2024-06-13 VITALS
HEART RATE: 90 BPM | OXYGEN SATURATION: 98 % | BODY MASS INDEX: 34.95 KG/M2 | SYSTOLIC BLOOD PRESSURE: 132 MMHG | DIASTOLIC BLOOD PRESSURE: 80 MMHG | TEMPERATURE: 97.6 F | WEIGHT: 258 LBS | HEIGHT: 72 IN

## 2024-06-13 DIAGNOSIS — Z12.5 ENCOUNTER FOR PROSTATE CANCER SCREENING: ICD-10-CM

## 2024-06-13 DIAGNOSIS — E78.01 FAMILIAL HYPERCHOLESTEROLEMIA: ICD-10-CM

## 2024-06-13 DIAGNOSIS — I10 ESSENTIAL HYPERTENSION: Primary | ICD-10-CM

## 2024-06-13 DIAGNOSIS — Z13.29 SCREENING FOR THYROID DISORDER: ICD-10-CM

## 2024-06-13 PROCEDURE — 3079F DIAST BP 80-89 MM HG: CPT | Performed by: NURSE PRACTITIONER

## 2024-06-13 PROCEDURE — 99214 OFFICE O/P EST MOD 30 MIN: CPT | Performed by: NURSE PRACTITIONER

## 2024-06-13 PROCEDURE — 3075F SYST BP GE 130 - 139MM HG: CPT | Performed by: NURSE PRACTITIONER

## 2024-06-13 RX ORDER — OMEPRAZOLE 40 MG/1
40 CAPSULE, DELAYED RELEASE ORAL 2 TIMES DAILY
Qty: 28 CAPSULE | Refills: 0 | Status: SHIPPED | OUTPATIENT
Start: 2024-06-13 | End: 2024-06-27

## 2024-06-13 RX ORDER — IBUPROFEN 800 MG/1
800 TABLET ORAL EVERY 8 HOURS PRN
COMMUNITY
Start: 2024-06-05

## 2024-06-13 RX ORDER — HYDROCHLOROTHIAZIDE 12.5 MG/1
12.5 TABLET ORAL DAILY
Qty: 90 TABLET | Refills: 1 | Status: SHIPPED | OUTPATIENT
Start: 2024-06-13

## 2024-06-13 RX ORDER — AMLODIPINE BESYLATE 10 MG/1
10 TABLET ORAL DAILY
Qty: 90 TABLET | Refills: 1 | Status: SHIPPED | OUTPATIENT
Start: 2024-06-13

## 2024-06-13 RX ORDER — CARVEDILOL 6.25 MG/1
6.25 TABLET ORAL 2 TIMES DAILY
Qty: 180 TABLET | Refills: 1 | Status: SHIPPED | OUTPATIENT
Start: 2024-06-13

## 2024-06-13 SDOH — ECONOMIC STABILITY: FOOD INSECURITY: WITHIN THE PAST 12 MONTHS, YOU WORRIED THAT YOUR FOOD WOULD RUN OUT BEFORE YOU GOT MONEY TO BUY MORE.: NEVER TRUE

## 2024-06-13 SDOH — ECONOMIC STABILITY: FOOD INSECURITY: WITHIN THE PAST 12 MONTHS, THE FOOD YOU BOUGHT JUST DIDN'T LAST AND YOU DIDN'T HAVE MONEY TO GET MORE.: NEVER TRUE

## 2024-06-13 SDOH — ECONOMIC STABILITY: INCOME INSECURITY: HOW HARD IS IT FOR YOU TO PAY FOR THE VERY BASICS LIKE FOOD, HOUSING, MEDICAL CARE, AND HEATING?: NOT HARD AT ALL

## 2024-06-13 ASSESSMENT — ENCOUNTER SYMPTOMS
TROUBLE SWALLOWING: 0
WHEEZING: 0
NAUSEA: 0
CONSTIPATION: 0
BACK PAIN: 0
COUGH: 0
BLOOD IN STOOL: 0
VOICE CHANGE: 0
SHORTNESS OF BREATH: 0
ABDOMINAL PAIN: 0
CHEST TIGHTNESS: 0
DIARRHEA: 0
VOMITING: 0

## 2024-06-13 NOTE — PROGRESS NOTES
file   Stress: Not on file   Social Connections: Not on file   Intimate Partner Violence: Not on file   Housing Stability: Low Risk  (6/13/2024)    Housing Stability Vital Sign     Unable to Pay for Housing in the Last Year: No     Number of Places Lived in the Last Year: 1     Unstable Housing in the Last Year: No       Vitals:    06/13/24 1532   BP: 132/80   Site: Left Upper Arm   Position: Sitting   Cuff Size: Medium Adult   Pulse: 90   Temp: 97.6 °F (36.4 °C)   TempSrc: Temporal   SpO2: 98%   Weight: 117 kg (258 lb)   Height: 1.829 m (6')         EXAM       Physical Exam  Vitals and nursing note reviewed.   Constitutional:       Appearance: Normal appearance.   HENT:      Head: Normocephalic.      Right Ear: Tympanic membrane and ear canal normal. There is no impacted cerumen.      Left Ear: Tympanic membrane and ear canal normal. There is no impacted cerumen.      Nose: Nose normal.      Mouth/Throat:      Mouth: Mucous membranes are dry.   Eyes:      Extraocular Movements: Extraocular movements intact.      Pupils: Pupils are equal, round, and reactive to light.   Neck:      Vascular: No carotid bruit.   Cardiovascular:      Rate and Rhythm: Normal rate and regular rhythm.      Pulses: Normal pulses.      Heart sounds: Normal heart sounds. No murmur heard.     No friction rub. No gallop.   Pulmonary:      Effort: Pulmonary effort is normal. No respiratory distress.      Breath sounds: Normal breath sounds. No stridor. No wheezing, rhonchi or rales.   Chest:      Chest wall: No tenderness.   Abdominal:      General: Bowel sounds are normal. There is no distension.      Palpations: Abdomen is soft.   Musculoskeletal:         General: No swelling, tenderness, deformity or signs of injury.      Cervical back: No rigidity. No muscular tenderness.      Right lower leg: No edema.      Left lower leg: No edema.   Lymphadenopathy:      Cervical: No cervical adenopathy.   Skin:     General: Skin is warm and dry.

## 2024-06-17 ENCOUNTER — TELEPHONE (OUTPATIENT)
Dept: FAMILY MEDICINE CLINIC | Age: 49
End: 2024-06-17

## 2024-06-17 RX ORDER — FUROSEMIDE 20 MG/1
20 TABLET ORAL DAILY
Qty: 7 TABLET | Refills: 0 | Status: SHIPPED | OUTPATIENT
Start: 2024-06-17

## 2024-06-25 ENCOUNTER — OFFICE VISIT (OUTPATIENT)
Dept: FAMILY MEDICINE CLINIC | Age: 49
End: 2024-06-25
Payer: COMMERCIAL

## 2024-06-25 VITALS
DIASTOLIC BLOOD PRESSURE: 88 MMHG | BODY MASS INDEX: 35.08 KG/M2 | HEIGHT: 72 IN | OXYGEN SATURATION: 97 % | WEIGHT: 259 LBS | TEMPERATURE: 97.3 F | RESPIRATION RATE: 16 BRPM | HEART RATE: 85 BPM | SYSTOLIC BLOOD PRESSURE: 132 MMHG

## 2024-06-25 DIAGNOSIS — R53.83 OTHER FATIGUE: ICD-10-CM

## 2024-06-25 DIAGNOSIS — E78.01 FAMILIAL HYPERCHOLESTEROLEMIA: ICD-10-CM

## 2024-06-25 DIAGNOSIS — Z82.49 FAMILY HISTORY OF HEART VALVE ABNORMALITY: ICD-10-CM

## 2024-06-25 DIAGNOSIS — R42 LIGHT-HEADED: ICD-10-CM

## 2024-06-25 DIAGNOSIS — I10 ESSENTIAL HYPERTENSION: Primary | ICD-10-CM

## 2024-06-25 PROCEDURE — 99213 OFFICE O/P EST LOW 20 MIN: CPT | Performed by: NURSE PRACTITIONER

## 2024-06-25 PROCEDURE — 3075F SYST BP GE 130 - 139MM HG: CPT | Performed by: NURSE PRACTITIONER

## 2024-06-25 PROCEDURE — 3079F DIAST BP 80-89 MM HG: CPT | Performed by: NURSE PRACTITIONER

## 2024-06-25 RX ORDER — OLMESARTAN MEDOXOMIL AND HYDROCHLOROTHIAZIDE 40/25 40; 25 MG/1; MG/1
1 TABLET ORAL DAILY
Qty: 30 TABLET | Refills: 5 | Status: SHIPPED | OUTPATIENT
Start: 2024-06-25

## 2024-06-25 ASSESSMENT — ENCOUNTER SYMPTOMS
VOICE CHANGE: 0
ABDOMINAL PAIN: 0
VOMITING: 0
NAUSEA: 0
CONSTIPATION: 0
COUGH: 0
WHEEZING: 0
BACK PAIN: 0
SHORTNESS OF BREATH: 0
TROUBLE SWALLOWING: 0
DIARRHEA: 0
CHEST TIGHTNESS: 0
BLOOD IN STOOL: 0

## 2024-06-25 NOTE — PROGRESS NOTES
Reji De León : 1975 Sex: male  Age: 49 y.o.    Chief Complaint   Patient presents with    Leg Swelling     Called last wk finished lasix , legs better then last wk but still a little swollen along with fatigue      ASSESSMENT AND PLAN     1. Essential hypertension  Overview:  Decreasing the Cozaar to 1 daily and will increase Lopressor if home blood pressure monitoring trends upward.    2. Familial hypercholesterolemia  3. BMI 35.0-35.9,adult  4. Other fatigue  5. Family history of heart valve abnormality  6. Light-headed      Lab / Imaging Results   (All laboratory and radiology results have been personally reviewed by myself)  Labs:  No results found for this visit on 24.    Imaging:  All Radiology results interpreted by Radiologist unless otherwise noted.  No results found.      WAY FORWARD     Educational materials printed for patient's review and were included in patient instructions on his After Visit Summary and given to patient at the end of visit.       Counseled regarding above diagnosis, including possible risks and complications,  especially if left uncontrolled.     Counseled regarding the possible side effects, risks, benefits and alternatives to treatment; patient and/or guardian verbalizes understanding, agrees, feels comfortable with and wishes to proceed with above treatment plan.     Advised patient to call with any new medication issues, and read all Rx info from pharmacy to assure aware of all possible risks and side effects of medication before taking.     Reviewed age and gender appropriate health screening exams and vaccinations.  Advised patient regarding importance of keeping up with recommended health maintenance and to schedule as soon as possible if overdue, as this is important in assessing for undiagnosed pathology, especially cancer, as well as protecting against potentially harmful/life threatening disease.       Patient verbalizes understanding and agrees with above

## 2024-08-05 ENCOUNTER — OFFICE VISIT (OUTPATIENT)
Dept: CARDIOLOGY CLINIC | Age: 49
End: 2024-08-05
Payer: COMMERCIAL

## 2024-08-05 VITALS
WEIGHT: 267 LBS | SYSTOLIC BLOOD PRESSURE: 159 MMHG | RESPIRATION RATE: 18 BRPM | DIASTOLIC BLOOD PRESSURE: 106 MMHG | BODY MASS INDEX: 36.16 KG/M2 | HEIGHT: 72 IN | HEART RATE: 70 BPM

## 2024-08-05 DIAGNOSIS — R07.9 CHEST PAIN, UNSPECIFIED TYPE: ICD-10-CM

## 2024-08-05 DIAGNOSIS — I10 ESSENTIAL HYPERTENSION: Primary | ICD-10-CM

## 2024-08-05 PROBLEM — Q24.8 LEFT VENTRICULAR OUTFLOW TRACT OBSTRUCTION: Status: RESOLVED | Noted: 2024-05-21 | Resolved: 2024-08-05

## 2024-08-05 PROBLEM — I16.0 HYPERTENSIVE URGENCY: Status: RESOLVED | Noted: 2024-05-20 | Resolved: 2024-08-05

## 2024-08-05 PROBLEM — I20.89 ATYPICAL ANGINA (HCC): Status: RESOLVED | Noted: 2024-05-20 | Resolved: 2024-08-05

## 2024-08-05 PROCEDURE — 93000 ELECTROCARDIOGRAM COMPLETE: CPT | Performed by: INTERNAL MEDICINE

## 2024-08-05 PROCEDURE — 3080F DIAST BP >= 90 MM HG: CPT | Performed by: INTERNAL MEDICINE

## 2024-08-05 PROCEDURE — 3077F SYST BP >= 140 MM HG: CPT | Performed by: INTERNAL MEDICINE

## 2024-08-05 PROCEDURE — 99214 OFFICE O/P EST MOD 30 MIN: CPT | Performed by: INTERNAL MEDICINE

## 2024-11-19 ENCOUNTER — TELEPHONE (OUTPATIENT)
Dept: FAMILY MEDICINE CLINIC | Age: 49
End: 2024-11-19

## 2024-11-19 NOTE — TELEPHONE ENCOUNTER
Received call from pt stating he thinks he is having issues with the one BP med you have him on. Olmesartan-hydrochlorothizide (Benicar HTC) 40-25.   Ever since he started taking it he has had stomach pain and bloating. Wanted to know if there was something else he could take.

## 2024-11-25 RX ORDER — OLMESARTAN MEDOXOMIL AND HYDROCHLOROTHIAZIDE 40/25 40; 25 MG/1; MG/1
1 TABLET ORAL DAILY
Qty: 30 TABLET | Refills: 5 | Status: SHIPPED | OUTPATIENT
Start: 2024-11-25

## 2024-11-25 NOTE — TELEPHONE ENCOUNTER
Name of Medication(s) Requested:  Requested Prescriptions     Pending Prescriptions Disp Refills    olmesartan-hydroCHLOROthiazide (BENICAR HCT) 40-25 MG per tablet [Pharmacy Med Name: OLMESARTAN-HCTZ 40-25 MG TAB] 30 tablet 5     Sig: TAKE 1 TABLET BY MOUTH EVERY DAY       Medication is on current medication list Yes    Dosage and directions were verified? Yes    Quantity verified: 30 day supply     Pharmacy Verified?  Yes    Last Appointment:  6/25/2024    Future appts:  Future Appointments   Date Time Provider Department Center   12/10/2024  8:00 AM SCHEDULE, MITCH ALBERTO E. [facility] DEP   12/17/2024  3:30 PM Francote, LORI Poole CNP [facility] DEP        (If no appt send self scheduling link. .REFILLAPPT)  Scheduling request sent?     [] Yes  [x] No    Does patient need updated?  [] Yes  [x] No

## 2024-12-06 RX ORDER — AMLODIPINE BESYLATE 10 MG/1
10 TABLET ORAL DAILY
Qty: 90 TABLET | Refills: 1 | Status: SHIPPED | OUTPATIENT
Start: 2024-12-06

## 2024-12-06 RX ORDER — CARVEDILOL 6.25 MG/1
6.25 TABLET ORAL 2 TIMES DAILY
Qty: 180 TABLET | Refills: 1 | Status: SHIPPED | OUTPATIENT
Start: 2024-12-06

## 2024-12-06 NOTE — TELEPHONE ENCOUNTER
Name of Medication(s) Requested:  Requested Prescriptions     Pending Prescriptions Disp Refills    amLODIPine (NORVASC) 10 MG tablet [Pharmacy Med Name: AMLODIPINE BESYLATE 10 MG TAB] 90 tablet 1     Sig: TAKE 1 TABLET BY MOUTH EVERY DAY    carvedilol (COREG) 6.25 MG tablet [Pharmacy Med Name: CARVEDILOL 6.25 MG TABLET] 180 tablet 1     Sig: TAKE 1 TABLET BY MOUTH TWICE A DAY       Medication is on current medication list Yes    Dosage and directions were verified? Yes    Quantity verified: 90 day supply     Pharmacy Verified?  Yes    Last Appointment:  6/25/2024    Future appts:  Future Appointments   Date Time Provider Department Center   12/10/2024  8:00 AM SCHEDULE, MITCH ALBERTO E. PAL [facility] DEP   12/17/2024  3:30 PM Virgilio Bates APRN - CNP E. [facility] DEP        (If no appt send self scheduling link. .REFILLAPPT)  Scheduling request sent?     [] Yes  [x] No    Does patient need updated?  [] Yes  [x] No

## 2024-12-10 ENCOUNTER — LAB (OUTPATIENT)
Dept: FAMILY MEDICINE CLINIC | Age: 49
End: 2024-12-10
Payer: COMMERCIAL

## 2024-12-10 DIAGNOSIS — Z12.5 ENCOUNTER FOR PROSTATE CANCER SCREENING: ICD-10-CM

## 2024-12-10 DIAGNOSIS — I10 ESSENTIAL HYPERTENSION: ICD-10-CM

## 2024-12-10 DIAGNOSIS — Z13.29 SCREENING FOR THYROID DISORDER: ICD-10-CM

## 2024-12-10 DIAGNOSIS — E78.01 FAMILIAL HYPERCHOLESTEROLEMIA: ICD-10-CM

## 2024-12-10 LAB
ALBUMIN: 4.5 G/DL (ref 3.5–5.2)
ALP BLD-CCNC: 47 U/L (ref 40–129)
ALT SERPL-CCNC: 73 U/L (ref 0–40)
ANION GAP SERPL CALCULATED.3IONS-SCNC: 21 MMOL/L (ref 7–16)
AST SERPL-CCNC: 39 U/L (ref 0–39)
BASOPHILS ABSOLUTE: 0.12 K/UL (ref 0–0.2)
BASOPHILS RELATIVE PERCENT: 2 % (ref 0–2)
BILIRUB SERPL-MCNC: 0.3 MG/DL (ref 0–1.2)
BUN BLDV-MCNC: 27 MG/DL (ref 6–20)
CALCIUM SERPL-MCNC: 9.4 MG/DL (ref 8.6–10.2)
CHLORIDE BLD-SCNC: 105 MMOL/L (ref 98–107)
CHOLESTEROL, TOTAL: 180 MG/DL
CO2: 17 MMOL/L (ref 22–29)
CREAT SERPL-MCNC: 1.6 MG/DL (ref 0.7–1.2)
EOSINOPHILS ABSOLUTE: 0.3 K/UL (ref 0.05–0.5)
EOSINOPHILS RELATIVE PERCENT: 4 % (ref 0–6)
GFR, ESTIMATED: 52 ML/MIN/1.73M2
GLUCOSE FASTING: 91 MG/DL (ref 74–99)
HCT VFR BLD CALC: 49.6 % (ref 37–54)
HDLC SERPL-MCNC: 41 MG/DL
HEMOGLOBIN: 15.8 G/DL (ref 12.5–16.5)
IMMATURE GRANULOCYTES %: 1 % (ref 0–5)
IMMATURE GRANULOCYTES ABSOLUTE: 0.07 K/UL (ref 0–0.58)
LDL CHOLESTEROL: 95 MG/DL
LYMPHOCYTES ABSOLUTE: 2.39 K/UL (ref 1.5–4)
LYMPHOCYTES RELATIVE PERCENT: 32 % (ref 20–42)
MCH RBC QN AUTO: 28.5 PG (ref 26–35)
MCHC RBC AUTO-ENTMCNC: 31.9 G/DL (ref 32–34.5)
MCV RBC AUTO: 89.4 FL (ref 80–99.9)
MONOCYTES ABSOLUTE: 0.71 K/UL (ref 0.1–0.95)
MONOCYTES RELATIVE PERCENT: 10 % (ref 2–12)
NEUTROPHILS ABSOLUTE: 3.86 K/UL (ref 1.8–7.3)
NEUTROPHILS RELATIVE PERCENT: 52 % (ref 43–80)
PDW BLD-RTO: 13.4 % (ref 11.5–15)
PLATELET # BLD: 267 K/UL (ref 130–450)
PMV BLD AUTO: 10.2 FL (ref 7–12)
POTASSIUM SERPL-SCNC: 4.8 MMOL/L (ref 3.5–5)
PROSTATE SPECIFIC ANTIGEN: 1.86 NG/ML (ref 0–4)
RBC # BLD: 5.55 M/UL (ref 3.8–5.8)
SODIUM BLD-SCNC: 143 MMOL/L (ref 132–146)
TOTAL PROTEIN: 7.9 G/DL (ref 6.4–8.3)
TRIGL SERPL-MCNC: 220 MG/DL
TSH SERPL DL<=0.05 MIU/L-ACNC: 1.56 UIU/ML (ref 0.27–4.2)
VLDLC SERPL CALC-MCNC: 44 MG/DL
WBC # BLD: 7.5 K/UL (ref 4.5–11.5)

## 2024-12-10 PROCEDURE — 36415 COLL VENOUS BLD VENIPUNCTURE: CPT | Performed by: NURSE PRACTITIONER

## 2024-12-10 NOTE — PROGRESS NOTES
Lab draw Right AC 22 x 1 1/4\". Venipuncture x 1 unsuccessful  Lab draw Left AC 22 x 1 1/4\". Venipuncture x 1 successful

## 2024-12-19 ENCOUNTER — OFFICE VISIT (OUTPATIENT)
Dept: FAMILY MEDICINE CLINIC | Age: 49
End: 2024-12-19

## 2024-12-19 VITALS
DIASTOLIC BLOOD PRESSURE: 86 MMHG | RESPIRATION RATE: 14 BRPM | TEMPERATURE: 97.2 F | WEIGHT: 269.2 LBS | SYSTOLIC BLOOD PRESSURE: 150 MMHG | HEIGHT: 72 IN | OXYGEN SATURATION: 98 % | HEART RATE: 72 BPM | BODY MASS INDEX: 36.46 KG/M2

## 2024-12-19 DIAGNOSIS — J02.9 SORE THROAT: ICD-10-CM

## 2024-12-19 DIAGNOSIS — R05.9 COUGH, UNSPECIFIED TYPE: ICD-10-CM

## 2024-12-19 DIAGNOSIS — Z20.818 STREPTOCOCCUS EXPOSURE: ICD-10-CM

## 2024-12-19 LAB
Lab: NORMAL
PERFORMING INSTRUMENT: NORMAL
QC PASS/FAIL: NORMAL
S PYO AG THROAT QL: NORMAL
SARS-COV-2, POC: NORMAL

## 2024-12-19 RX ORDER — BENZONATATE 100 MG/1
100 CAPSULE ORAL 3 TIMES DAILY PRN
Qty: 30 CAPSULE | Refills: 0 | Status: SHIPPED | OUTPATIENT
Start: 2024-12-19 | End: 2024-12-29

## 2024-12-19 RX ORDER — AZITHROMYCIN 250 MG/1
250 TABLET, FILM COATED ORAL SEE ADMIN INSTRUCTIONS
Qty: 6 TABLET | Refills: 0 | Status: SHIPPED | OUTPATIENT
Start: 2024-12-19 | End: 2024-12-24

## 2024-12-19 ASSESSMENT — ENCOUNTER SYMPTOMS
VOMITING: 0
SHORTNESS OF BREATH: 0
COUGH: 1
ABDOMINAL PAIN: 0
CHEST TIGHTNESS: 0
EYES NEGATIVE: 1
SORE THROAT: 1
SINUS PRESSURE: 0
WHEEZING: 0
NAUSEA: 0
DIARRHEA: 0

## 2024-12-19 NOTE — PROGRESS NOTES
Judgment: Judgment normal.                 Assessment and Plan:  Reji was seen today for cough and pharyngitis.    Diagnoses and all orders for this visit:    Cough, unspecified type  -     POCT COVID-19, Antigen    Sore throat  -     POCT COVID-19, Antigen  -     POCT rapid strep A    Streptococcus exposure    Other orders  -     benzonatate (TESSALON) 100 MG capsule; Take 1 capsule by mouth 3 times daily as needed for Cough  -     azithromycin (ZITHROMAX) 250 MG tablet; Take 1 tablet by mouth See Admin Instructions for 5 days 500mg on day 1 followed by 250mg on days 2 - 5    Plan: Both COVID and strep swabs are negative.  Will treat with Z-Tanner and Tessalon Perles with his recent strep exposure.  Warned of potential side effects of medication.  Probiotic.  Push fluids.  Follow-up with PCP.  Notify us if not improving.    Return for fu pcp.    Seen By:  Dashawn Montalvo MD      *Document was created using voice recognition software.  Note was reviewed however may contain grammatical errors.

## 2025-01-06 ASSESSMENT — PATIENT HEALTH QUESTIONNAIRE - PHQ9
2. FEELING DOWN, DEPRESSED OR HOPELESS: NOT AT ALL
SUM OF ALL RESPONSES TO PHQ QUESTIONS 1-9: 0
SUM OF ALL RESPONSES TO PHQ QUESTIONS 1-9: 0
2. FEELING DOWN, DEPRESSED OR HOPELESS: NOT AT ALL
1. LITTLE INTEREST OR PLEASURE IN DOING THINGS: NOT AT ALL
SUM OF ALL RESPONSES TO PHQ9 QUESTIONS 1 & 2: 0
SUM OF ALL RESPONSES TO PHQ9 QUESTIONS 1 & 2: 0
SUM OF ALL RESPONSES TO PHQ QUESTIONS 1-9: 0
1. LITTLE INTEREST OR PLEASURE IN DOING THINGS: NOT AT ALL
SUM OF ALL RESPONSES TO PHQ QUESTIONS 1-9: 0

## 2025-01-07 ENCOUNTER — OFFICE VISIT (OUTPATIENT)
Dept: FAMILY MEDICINE CLINIC | Age: 50
End: 2025-01-07

## 2025-01-07 VITALS
HEIGHT: 72 IN | RESPIRATION RATE: 18 BRPM | DIASTOLIC BLOOD PRESSURE: 82 MMHG | WEIGHT: 275 LBS | OXYGEN SATURATION: 96 % | TEMPERATURE: 98 F | HEART RATE: 74 BPM | SYSTOLIC BLOOD PRESSURE: 130 MMHG | BODY MASS INDEX: 37.25 KG/M2

## 2025-01-07 DIAGNOSIS — I10 ESSENTIAL HYPERTENSION: Primary | ICD-10-CM

## 2025-01-07 DIAGNOSIS — M25.50 ARTHRALGIA, UNSPECIFIED JOINT: ICD-10-CM

## 2025-01-07 DIAGNOSIS — M25.512 CHRONIC LEFT SHOULDER PAIN: ICD-10-CM

## 2025-01-07 DIAGNOSIS — G89.29 CHRONIC LEFT SHOULDER PAIN: ICD-10-CM

## 2025-01-07 DIAGNOSIS — R53.82 CHRONIC FATIGUE: ICD-10-CM

## 2025-01-07 DIAGNOSIS — Z82.49 FAMILY HISTORY OF HEART VALVE ABNORMALITY: ICD-10-CM

## 2025-01-07 DIAGNOSIS — Z13.29 SCREENING FOR THYROID DISORDER: ICD-10-CM

## 2025-01-07 DIAGNOSIS — E78.01 FAMILIAL HYPERCHOLESTEROLEMIA: ICD-10-CM

## 2025-01-07 ASSESSMENT — ENCOUNTER SYMPTOMS
VOICE CHANGE: 0
NAUSEA: 0
CONSTIPATION: 0
TROUBLE SWALLOWING: 0
DIARRHEA: 0
BACK PAIN: 0
CHEST TIGHTNESS: 0
COUGH: 0
SHORTNESS OF BREATH: 0
BLOOD IN STOOL: 0
ABDOMINAL PAIN: 0
WHEEZING: 0
VOMITING: 0

## 2025-01-07 NOTE — PROGRESS NOTES
Tympanic membrane and ear canal normal. There is no impacted cerumen.      Nose: Nose normal.      Mouth/Throat:      Mouth: Mucous membranes are dry.   Eyes:      Extraocular Movements: Extraocular movements intact.      Pupils: Pupils are equal, round, and reactive to light.   Neck:      Vascular: No carotid bruit.   Cardiovascular:      Rate and Rhythm: Normal rate and regular rhythm.      Pulses: Normal pulses.      Heart sounds: Normal heart sounds. No murmur heard.     No friction rub. No gallop.   Pulmonary:      Effort: Pulmonary effort is normal. No respiratory distress.      Breath sounds: Normal breath sounds. No stridor. No wheezing, rhonchi or rales.   Chest:      Chest wall: No tenderness.   Abdominal:      General: Bowel sounds are normal. There is no distension.      Palpations: Abdomen is soft.   Musculoskeletal:         General: No swelling, tenderness, deformity or signs of injury.      Cervical back: No rigidity. No muscular tenderness.      Right lower leg: No edema.      Left lower leg: No edema.   Lymphadenopathy:      Cervical: No cervical adenopathy.   Skin:     General: Skin is warm and dry.      Capillary Refill: Capillary refill takes 2 to 3 seconds.      Findings: No bruising, lesion or rash.   Neurological:      General: No focal deficit present.      Mental Status: He is alert and oriented to person, place, and time.      Motor: No weakness.      Gait: Gait normal.   Psychiatric:         Attention and Perception: Attention normal.         Mood and Affect: Mood normal.         Behavior: Behavior normal.         Thought Content: Thought content does not include homicidal or suicidal ideation. Thought content does not include homicidal or suicidal plan.          Seen By:  LORI Coyle - CNP  *NOTE: This report was transcribed using voice recognition software. Every effort was made to ensure accuracy; however, inadvertent computerized transcription errors may be present.

## 2025-01-15 ENCOUNTER — TELEPHONE (OUTPATIENT)
Dept: FAMILY MEDICINE CLINIC | Age: 50
End: 2025-01-15

## 2025-01-15 NOTE — TELEPHONE ENCOUNTER
----- Message from LORI Coyle CNP sent at 1/15/2025 11:37 AM EST -----  PLEASE CALL PATIENT AND DISCUSS THE RESULTS WITH THEM NO NEW ORDERS    IMPRESSION:  No acute abnormality.     AC joint degenerative changes

## 2025-02-10 SDOH — HEALTH STABILITY: PHYSICAL HEALTH: ON AVERAGE, HOW MANY DAYS PER WEEK DO YOU ENGAGE IN MODERATE TO STRENUOUS EXERCISE (LIKE A BRISK WALK)?: 1 DAY

## 2025-02-10 SDOH — HEALTH STABILITY: PHYSICAL HEALTH: ON AVERAGE, HOW MANY MINUTES DO YOU ENGAGE IN EXERCISE AT THIS LEVEL?: 30 MIN

## 2025-02-13 ENCOUNTER — OFFICE VISIT (OUTPATIENT)
Dept: ORTHOPEDIC SURGERY | Age: 50
End: 2025-02-13

## 2025-02-13 VITALS
DIASTOLIC BLOOD PRESSURE: 79 MMHG | OXYGEN SATURATION: 95 % | HEART RATE: 103 BPM | BODY MASS INDEX: 33.86 KG/M2 | WEIGHT: 250 LBS | HEIGHT: 72 IN | SYSTOLIC BLOOD PRESSURE: 121 MMHG | TEMPERATURE: 98 F | RESPIRATION RATE: 14 BRPM

## 2025-02-13 DIAGNOSIS — M25.812 IMPINGEMENT OF LEFT SHOULDER: Primary | ICD-10-CM

## 2025-02-13 DIAGNOSIS — M25.512 CHRONIC LEFT SHOULDER PAIN: ICD-10-CM

## 2025-02-13 DIAGNOSIS — G89.29 CHRONIC LEFT SHOULDER PAIN: ICD-10-CM

## 2025-02-13 RX ORDER — LIDOCAINE HYDROCHLORIDE 10 MG/ML
1 INJECTION, SOLUTION INFILTRATION; PERINEURAL ONCE
Status: COMPLETED | OUTPATIENT
Start: 2025-02-13 | End: 2025-02-13

## 2025-02-13 RX ORDER — BUPIVACAINE HYDROCHLORIDE 2.5 MG/ML
1 INJECTION, SOLUTION INFILTRATION; PERINEURAL ONCE
Status: COMPLETED | OUTPATIENT
Start: 2025-02-13 | End: 2025-02-13

## 2025-02-13 RX ORDER — TRIAMCINOLONE ACETONIDE 40 MG/ML
40 INJECTION, SUSPENSION INTRA-ARTICULAR; INTRAMUSCULAR ONCE
Status: COMPLETED | OUTPATIENT
Start: 2025-02-13 | End: 2025-02-13

## 2025-02-13 RX ADMIN — LIDOCAINE HYDROCHLORIDE 1 ML: 10 INJECTION, SOLUTION INFILTRATION; PERINEURAL at 13:28

## 2025-02-13 RX ADMIN — BUPIVACAINE HYDROCHLORIDE 2.5 MG: 2.5 INJECTION, SOLUTION INFILTRATION; PERINEURAL at 13:28

## 2025-02-13 RX ADMIN — TRIAMCINOLONE ACETONIDE 40 MG: 40 INJECTION, SUSPENSION INTRA-ARTICULAR; INTRAMUSCULAR at 13:29

## 2025-02-13 NOTE — PROGRESS NOTES
[facility]   ORTHOPAEDIC SURGERY   DATE OF VISIT: 02/13/25  New Shoulder Patient Visit     Referring Provider:   Virgilio Bates APRN - CNP  [address] [place] [address]    CHIEF COMPLAINT:   Chief Complaint   Patient presents with    New Patient     Patient is here for L shoulder pain x 2 years         HPI:     History of Present Illness  The patient presents for evaluation of left shoulder pain.    He has been experiencing persistent discomfort in his left shoulder for a duration of 2 years, with no recollection of any specific injury or event that could have precipitated the condition. He is right-handed and reports significant discomfort while driving, particularly when using his left hand. His occupation involves frequent operation of equipment, which necessitates constant use of an armrest, a practice that seems to exacerbate his symptoms. He also experiences pain at night. Additionally, he mentions an inability to maintain the position of his bow during hunting activities, a task he could previously perform without difficulty. 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 He has attempted to manage the pain with over-the-counter medications such as Advil and Tylenol.    SOCIAL HISTORY  He works for [employer] for almost 30 years.    MEDICATIONS  Advil, Tylenol      PAST MEDICAL HISTORY  Past Medical History:   Diagnosis Date    HTN (hypertension)        PAST SURGICAL HISTORY  No past surgical history on file.    FAMILY HISTORY   Family History   Problem Relation Age of Onset    High Cholesterol Mother     Diabetes Mother     High Blood Pressure Mother     Stroke Father     High Blood Pressure Father     Heart Disease Father     High Blood Pressure Brother        SOCIAL HISTORY  Social History     Occupational History    Not on file   Tobacco Use    Smoking status: Never     Passive

## 2025-04-10 ENCOUNTER — OFFICE VISIT (OUTPATIENT)
Dept: ORTHOPEDIC SURGERY | Age: 50
End: 2025-04-10
Payer: COMMERCIAL

## 2025-04-10 VITALS
DIASTOLIC BLOOD PRESSURE: 85 MMHG | HEIGHT: 72 IN | WEIGHT: 245 LBS | HEART RATE: 66 BPM | OXYGEN SATURATION: 96 % | SYSTOLIC BLOOD PRESSURE: 130 MMHG | TEMPERATURE: 97.3 F | BODY MASS INDEX: 33.18 KG/M2

## 2025-04-10 DIAGNOSIS — M25.812 IMPINGEMENT OF LEFT SHOULDER: Primary | ICD-10-CM

## 2025-04-10 PROCEDURE — 3075F SYST BP GE 130 - 139MM HG: CPT | Performed by: STUDENT IN AN ORGANIZED HEALTH CARE EDUCATION/TRAINING PROGRAM

## 2025-04-10 PROCEDURE — 3079F DIAST BP 80-89 MM HG: CPT | Performed by: STUDENT IN AN ORGANIZED HEALTH CARE EDUCATION/TRAINING PROGRAM

## 2025-04-10 PROCEDURE — 99213 OFFICE O/P EST LOW 20 MIN: CPT | Performed by: STUDENT IN AN ORGANIZED HEALTH CARE EDUCATION/TRAINING PROGRAM

## 2025-04-10 NOTE — PROGRESS NOTES
treatment. The pain is now primarily in the afternoon and is described as nagging. He has been performing home exercises but has not attended physical therapy due to time constraints.    Continue taking Aleve for pain management, provided there are no underlying chronic kidney conditions or stomach ulcers. An MRI of the shoulder will be ordered to confirm the diagnosis. If a small tear is identified, the decision to proceed with surgical intervention will be based on comfort level and functional status. A repeat injection will be considered in approximately 5 weeks to alleviate symptoms during the summer. If a tear is confirmed, surgical intervention may be scheduled for late fall or early winter.    Follow-up  Follow up in 5 weeks. Ensure MRI is completed before the follow-up appointment.      Mg Snyder, DO  Orthopaedic Surgery   4/10/25  12:45 PM

## 2025-05-02 ENCOUNTER — HOSPITAL ENCOUNTER (OUTPATIENT)
Dept: MRI IMAGING | Age: 50
Discharge: HOME | End: 2025-05-04
Attending: STUDENT IN AN ORGANIZED HEALTH CARE EDUCATION/TRAINING PROGRAM

## 2025-05-02 DIAGNOSIS — M25.812 IMPINGEMENT OF LEFT SHOULDER: ICD-10-CM

## 2025-06-23 RX ORDER — OLMESARTAN MEDOXOMIL AND HYDROCHLOROTHIAZIDE 40/25 40; 25 MG/1; MG/1
1 TABLET ORAL DAILY
Qty: 30 TABLET | Refills: 5 | Status: SHIPPED | OUTPATIENT
Start: 2025-06-23

## 2025-06-23 NOTE — TELEPHONE ENCOUNTER
Name of Medication(s) Requested:  Requested Prescriptions     Pending Prescriptions Disp Refills    olmesartan-hydroCHLOROthiazide (BENICAR HCT) 40-25 MG per tablet 30 tablet 5     Sig: Take 1 tablet by mouth daily       Medication is on current medication list Yes    Dosage and directions were verified? Yes    Quantity verified: 30 day supply     Pharmacy Verified?  Yes    Last Appointment:  1/7/2025    Future appts:  Future Appointments   Date Time Provider Department Center   7/8/2025  3:30 PM Virgilio Bates APRN - CNP E. [facility] DEP        (If no appt send self scheduling link. .REFILLAPPT)  Scheduling request sent?     [] Yes  [x] No    Does patient need updated?  [] Yes  [x] No

## 2025-07-06 SDOH — ECONOMIC STABILITY: INCOME INSECURITY: IN THE LAST 12 MONTHS, WAS THERE A TIME WHEN YOU WERE NOT ABLE TO PAY THE MORTGAGE OR RENT ON TIME?: NO

## 2025-07-06 SDOH — ECONOMIC STABILITY: FOOD INSECURITY: WITHIN THE PAST 12 MONTHS, THE FOOD YOU BOUGHT JUST DIDN'T LAST AND YOU DIDN'T HAVE MONEY TO GET MORE.: NEVER TRUE

## 2025-07-06 SDOH — ECONOMIC STABILITY: TRANSPORTATION INSECURITY
IN THE PAST 12 MONTHS, HAS THE LACK OF TRANSPORTATION KEPT YOU FROM MEDICAL APPOINTMENTS OR FROM GETTING MEDICATIONS?: NO

## 2025-07-06 SDOH — ECONOMIC STABILITY: FOOD INSECURITY: WITHIN THE PAST 12 MONTHS, YOU WORRIED THAT YOUR FOOD WOULD RUN OUT BEFORE YOU GOT MONEY TO BUY MORE.: NEVER TRUE

## 2025-07-06 SDOH — ECONOMIC STABILITY: TRANSPORTATION INSECURITY
IN THE PAST 12 MONTHS, HAS LACK OF TRANSPORTATION KEPT YOU FROM MEETINGS, WORK, OR FROM GETTING THINGS NEEDED FOR DAILY LIVING?: NO

## 2025-07-08 ENCOUNTER — OFFICE VISIT (OUTPATIENT)
Dept: FAMILY MEDICINE CLINIC | Age: 50
End: 2025-07-08

## 2025-07-08 VITALS
SYSTOLIC BLOOD PRESSURE: 124 MMHG | TEMPERATURE: 98.1 F | RESPIRATION RATE: 16 BRPM | WEIGHT: 260 LBS | BODY MASS INDEX: 35.21 KG/M2 | HEIGHT: 72 IN | DIASTOLIC BLOOD PRESSURE: 82 MMHG | HEART RATE: 80 BPM | OXYGEN SATURATION: 99 %

## 2025-07-08 DIAGNOSIS — E78.01 FAMILIAL HYPERCHOLESTEROLEMIA: Primary | ICD-10-CM

## 2025-07-08 DIAGNOSIS — Z12.5 ENCOUNTER FOR PROSTATE CANCER SCREENING: ICD-10-CM

## 2025-07-08 DIAGNOSIS — Z82.49 FAMILY HISTORY OF HEART VALVE ABNORMALITY: ICD-10-CM

## 2025-07-08 DIAGNOSIS — R53.82 CHRONIC FATIGUE: ICD-10-CM

## 2025-07-08 DIAGNOSIS — Z13.29 SCREENING FOR THYROID DISORDER: ICD-10-CM

## 2025-07-08 DIAGNOSIS — G89.29 CHRONIC LEFT SHOULDER PAIN: ICD-10-CM

## 2025-07-08 DIAGNOSIS — M25.512 CHRONIC LEFT SHOULDER PAIN: ICD-10-CM

## 2025-07-08 DIAGNOSIS — I10 ESSENTIAL HYPERTENSION: ICD-10-CM

## 2025-07-08 RX ORDER — PREDNISONE 10 MG/1
10 TABLET ORAL 2 TIMES DAILY
Qty: 10 TABLET | Refills: 0 | Status: SHIPPED | OUTPATIENT
Start: 2025-07-08 | End: 2025-07-13

## 2025-07-08 RX ORDER — CYCLOBENZAPRINE HCL 10 MG
10 TABLET ORAL 3 TIMES DAILY PRN
Qty: 21 TABLET | Refills: 0 | Status: SHIPPED | OUTPATIENT
Start: 2025-07-08 | End: 2025-07-18

## 2025-07-08 SDOH — ECONOMIC STABILITY: FOOD INSECURITY: WITHIN THE PAST 12 MONTHS, THE FOOD YOU BOUGHT JUST DIDN'T LAST AND YOU DIDN'T HAVE MONEY TO GET MORE.: NEVER TRUE

## 2025-07-08 SDOH — ECONOMIC STABILITY: FOOD INSECURITY: WITHIN THE PAST 12 MONTHS, YOU WORRIED THAT YOUR FOOD WOULD RUN OUT BEFORE YOU GOT MONEY TO BUY MORE.: NEVER TRUE

## 2025-07-08 ASSESSMENT — ENCOUNTER SYMPTOMS
DIARRHEA: 0
CHEST TIGHTNESS: 0
ABDOMINAL PAIN: 0
VOICE CHANGE: 0
NAUSEA: 0
BLOOD IN STOOL: 0
WHEEZING: 0
CONSTIPATION: 0
BACK PAIN: 0
COUGH: 0
SHORTNESS OF BREATH: 0
TROUBLE SWALLOWING: 0
VOMITING: 0

## 2025-07-08 NOTE — PATIENT INSTRUCTIONS
community and other resources  Contact: [phone]; [address] [place] [address]        [facility]  What they offer: Disaster Relief, Domestic Violence, Emergency Assistance, Family Support, Senior Support  [facility]  Contact: [phone]; [address] [place] [address]  [facility]  Contact: [phone]; [address] [place] [address]  [facility]  Contact: [phone]; [address] [place]  [address]  [place] Transportation Resources*  (Call [employer]/211 if need more resources.)       [employer] (CARTS):  What they offer: Public transportation for all of [facility]. Call at least 24 hours in advance to make reservation. Reduced rates for age 65 and over.   Phone Number: [phone] ext [phone]    [facility] TRANSPORTATION:  What they offer: No cost transportation for [place] residents aged 65 and disabled; weekly shopping schedule.  Phone: [phone] choose option for [facility]'s office    [facility] SERVICES TRANSPORTATION:  What they offer: Transportation for [facility] residents aged 60 and over who do not require assistance getting on or off vehicle. Service for medical appointments, congregate meal sites, grocery shopping or social service appointments for most of [facility]. Requires 2 week advanced notice   Phone: [phone]    [facility] TRANSPORTATION:  What they offer: Transportation for [facility] residents aged 60 and over or disabled on a weekly schedule for medical appointments, shopping, banking, etc. in [facility] and [place]. No cost  Phone: [phone] [phone]    [facility] DEPARTMENT OF JOB AND FAMILY SERVICES (NET)  What they offer: no cost transportation for persons on traditional Medicaid  Phone number: [phone] ext [phone]    [facility] [employer]SP[employer]ATION:  What they offer: Transportation for city residents aged 60 and over for area

## 2025-07-08 NOTE — PROGRESS NOTES
confusion, hallucinations and suicidal ideas. The patient is not nervous/anxious.          Current Outpatient Medications:     cyclobenzaprine (FLEXERIL) 10 MG tablet, Take 1 tablet by mouth 3 times daily as needed for Muscle spasms, Disp: 21 tablet, Rfl: 0    predniSONE (DELTASONE) 10 MG tablet, Take 1 tablet by mouth 2 times daily for 5 days, Disp: 10 tablet, Rfl: 0    olmesartan-hydroCHLOROthiazide (BENICAR HCT) 40-25 MG per tablet, Take 1 tablet by mouth daily, Disp: 30 tablet, Rfl: 5    amLODIPine (NORVASC) 10 MG tablet, TAKE 1 TABLET BY MOUTH EVERY DAY, Disp: 90 tablet, Rfl: 1    carvedilol (COREG) 6.25 MG tablet, TAKE 1 TABLET BY MOUTH TWICE A DAY, Disp: 180 tablet, Rfl: 1    Specialty Vitamins Products ([facility] ENERGY METABOLISM) TABS, Take 2 tablets by mouth every morning, Disp: , Rfl:     Omega-3 Fatty Acids (FISH OIL) 1000 MG capsule, Take 1 capsule by mouth every morning, Disp: , Rfl:   Allergies   Allergen Reactions    Cashew Nut (Anacardium Occidentale) Skin Test Shortness Of Breath and Swelling     THROAT SWELLING    Corn-Containing Products Shortness Of Breath and Swelling     CORN CHIPS: THROAT SWELLING    Amoxil [Amoxicillin] Hives and Swelling    Other Other (See Comments)     RU-TUSS: UNKNOWN REACTION        Yellow Jacket Venom Swelling     SWELLING @ SITE        Past Medical History:   Diagnosis Date    HTN (hypertension)      History reviewed. No pertinent surgical history.  Family History   Problem Relation Age of Onset    High Cholesterol Mother     Diabetes Mother     High Blood Pressure Mother     Stroke Father     High Blood Pressure Father     Heart Disease Father     High Blood Pressure Brother      Social History     Socioeconomic History    Marital status:      Spouse name: Not on file    Number of children: Not on file    Years of education: Not on file    Highest education level: Not on file   Occupational History    Not on file   Tobacco Use    Smoking status: Never